# Patient Record
Sex: FEMALE | Race: WHITE | Employment: FULL TIME | URBAN - METROPOLITAN AREA
[De-identification: names, ages, dates, MRNs, and addresses within clinical notes are randomized per-mention and may not be internally consistent; named-entity substitution may affect disease eponyms.]

---

## 2023-10-11 ENCOUNTER — TELEPHONE (OUTPATIENT)
Dept: OBSTETRICS AND GYNECOLOGY | Facility: CLINIC | Age: 48
End: 2023-10-11

## 2023-10-12 DIAGNOSIS — Z30.41 ENCOUNTER FOR SURVEILLANCE OF CONTRACEPTIVE PILLS: Primary | ICD-10-CM

## 2023-10-12 RX ORDER — NORETHINDRONE ACETATE AND ETHINYL ESTRADIOL AND FERROUS FUMARATE 1MG-20(21)
1 KIT ORAL DAILY
COMMUNITY
End: 2023-10-12 | Stop reason: SDUPTHER

## 2023-10-12 RX ORDER — MELOXICAM 15 MG/1
15 TABLET ORAL DAILY PRN
COMMUNITY
End: 2023-11-13 | Stop reason: ALTCHOICE

## 2023-10-12 RX ORDER — PRAVASTATIN SODIUM 40 MG/1
40 TABLET ORAL DAILY
COMMUNITY
End: 2023-10-18

## 2023-10-12 RX ORDER — CLONAZEPAM 1 MG/1
1 TABLET ORAL DAILY PRN
COMMUNITY
End: 2023-10-30 | Stop reason: SDUPTHER

## 2023-10-12 RX ORDER — PANTOPRAZOLE SODIUM 20 MG/1
20 TABLET, DELAYED RELEASE ORAL DAILY PRN
COMMUNITY
End: 2023-12-14 | Stop reason: SDUPTHER

## 2023-10-12 NOTE — TELEPHONE ENCOUNTER
Patient was just here for annual in September. She needs a 90 day prescription of her birth control sent in to mail order pharmacy

## 2023-10-13 RX ORDER — NORETHINDRONE ACETATE AND ETHINYL ESTRADIOL 1MG-20(21)
1 KIT ORAL DAILY
Qty: 84 TABLET | Refills: 3 | Status: SHIPPED | OUTPATIENT
Start: 2023-10-13 | End: 2023-11-13 | Stop reason: WASHOUT

## 2023-11-01 ENCOUNTER — TELEPHONE (OUTPATIENT)
Dept: PRIMARY CARE | Facility: CLINIC | Age: 48
End: 2023-11-01
Payer: COMMERCIAL

## 2023-11-01 NOTE — TELEPHONE ENCOUNTER
Rx Refill Request Telephone Encounter    Name:  Steffany Kelly  :  [id number]  Medication Name:  clonazepam  1mg  By mouth  Daily prn  15    Specific Pharmacy location:  [facility]  Date of last appointment:    Date of next appointment:  23  Best number to reach patient:  [phone]

## 2023-11-11 PROBLEM — E78.5 HYPERLIPIDEMIA: Status: ACTIVE | Noted: 2023-11-11

## 2023-11-11 PROBLEM — R10.13 DYSPEPSIA: Status: ACTIVE | Noted: 2023-11-11

## 2023-11-11 PROBLEM — R87.619 ATYPICAL GLANDULAR CELLS ON CERVICAL PAP SMEAR: Status: ACTIVE | Noted: 2023-11-11

## 2023-11-11 PROBLEM — N64.89 BREAST ASYMMETRY IN FEMALE: Status: ACTIVE | Noted: 2023-11-11

## 2023-11-11 PROBLEM — M79.651 PAIN OF RIGHT THIGH: Status: ACTIVE | Noted: 2023-11-11

## 2023-11-11 PROBLEM — F41.0 PANIC ATTACKS: Status: ACTIVE | Noted: 2023-11-11

## 2023-11-11 PROBLEM — M54.50 LUMBOSACRAL PAIN: Status: ACTIVE | Noted: 2023-11-11

## 2023-11-11 PROBLEM — F41.9 ANXIETY: Status: ACTIVE | Noted: 2023-11-11

## 2023-11-13 ENCOUNTER — OFFICE VISIT (OUTPATIENT)
Dept: PRIMARY CARE | Facility: CLINIC | Age: 48
End: 2023-11-13
Payer: COMMERCIAL

## 2023-11-13 VITALS
SYSTOLIC BLOOD PRESSURE: 122 MMHG | WEIGHT: 215 LBS | BODY MASS INDEX: 46.38 KG/M2 | TEMPERATURE: 97.5 F | HEART RATE: 84 BPM | DIASTOLIC BLOOD PRESSURE: 84 MMHG | HEIGHT: 57 IN

## 2023-11-13 DIAGNOSIS — M79.644 THUMB PAIN, RIGHT: Primary | ICD-10-CM

## 2023-11-13 DIAGNOSIS — M41.86 LEVOSCOLIOSIS OF LUMBAR SPINE: ICD-10-CM

## 2023-11-13 PROBLEM — E78.00 HYPERCHOLESTEROLEMIA: Status: ACTIVE | Noted: 2019-12-17

## 2023-11-13 PROBLEM — M54.50 CHRONIC MIDLINE LOW BACK PAIN WITHOUT SCIATICA: Status: ACTIVE | Noted: 2017-07-31

## 2023-11-13 PROBLEM — G89.29 CHRONIC MIDLINE LOW BACK PAIN WITHOUT SCIATICA: Status: ACTIVE | Noted: 2017-07-31

## 2023-11-13 PROBLEM — F41.9 ANXIETY: Status: ACTIVE | Noted: 2018-11-07

## 2023-11-13 PROBLEM — M51.36 DEGENERATION OF LUMBAR INTERVERTEBRAL DISC: Status: ACTIVE | Noted: 2023-11-13

## 2023-11-13 PROBLEM — M51.369 DEGENERATION OF LUMBAR INTERVERTEBRAL DISC: Status: ACTIVE | Noted: 2023-11-13

## 2023-11-13 PROCEDURE — 1036F TOBACCO NON-USER: CPT | Performed by: FAMILY MEDICINE

## 2023-11-13 PROCEDURE — 99213 OFFICE O/P EST LOW 20 MIN: CPT | Performed by: FAMILY MEDICINE

## 2023-11-13 RX ORDER — CELECOXIB 200 MG/1
200 CAPSULE ORAL DAILY
Qty: 10 CAPSULE | Refills: 0 | Status: SHIPPED | OUTPATIENT
Start: 2023-11-13 | End: 2023-12-14 | Stop reason: WASHOUT

## 2023-11-13 RX ORDER — NORETHINDRONE ACETATE AND ETHINYL ESTRADIOL 1MG-20(21)
1 KIT ORAL DAILY
COMMUNITY
End: 2023-12-20

## 2023-11-13 NOTE — PROGRESS NOTES
"Subjective   Patient ID: Steffany Kelly is a 48 y.o. female who presents for Results (Follow up on xray).    HPI   Here today for follow-up and review of x-ray results.  1.  Low back pain-frequent ache in the left lower back, worse with prolonged standing.  Radiates to left buttock, but not down leg.  Denies left lower extremity weakness, tingling, numbness.  No bowel or bladder incontinence.  2.  Right thumb pain-no relief from topical Voltaren.  Continues to have aching at base of thumb, affects .  No numbness or tingling.  Review of Systems  Per HPI  Objective   /84 (BP Location: Left arm, Patient Position: Sitting, BP Cuff Size: Large adult)   Pulse 84   Temp 36.4 °C (97.5 °F)   Ht 1.448 m (4' 9\")   Wt 97.5 kg (215 lb)   BMI 46.53 kg/m²     Physical Exam  Vitals reviewed  General: Obese female, no acute distress  Musculoskeletal: Right hand examination reveals tenderness today most significant at the right radiocarpal joint, no overlying swelling, no significant pain today with palpation of her right thumb MCP joint.  Negative Finkelstein test.  No pain with palpation over the right wrist.  Examination of back reveals no pain with palpation over the lumbar spine.  Mild tenderness palpation over the left lumbar paraspinal musculature with no discrete trigger point.    Reviewed x-ray reports from network radiology.  Right thumb imaging reveals no evidence of degenerative changes.  Does not comment specifically on the radiocarpal joint.  Unable to view images that patient brought today on CD.  Lumbar spine films  Assessment/Plan          "

## 2023-11-14 NOTE — PROGRESS NOTES
"Subjective   Patient ID: Steffany Kelly is a 48 y.o. female who presents for Results (Follow up on xray).    HPI   Here today for follow-up and review of x-ray results.  1.  Low back pain-frequent ache in the left lower back, worse with prolonged standing.  Radiates to left buttock, but not down leg.  Denies left lower extremity weakness, tingling, numbness.  No bowel or bladder incontinence.  2.  Right thumb pain-no relief from topical Voltaren.  Continues to have aching at base of thumb, affects .  No numbness or tingling.  Review of Systems  Per HPI  Objective   /84 (BP Location: Left arm, Patient Position: Sitting, BP Cuff Size: Large adult)   Pulse 84   Temp 36.4 °C (97.5 °F)   Ht 1.448 m (4' 9\")   Wt 97.5 kg (215 lb)   BMI 46.53 kg/m²     Physical Exam  Vitals reviewed  General: Obese female, no acute distress  Musculoskeletal: Right hand examination reveals tenderness today most significant at the right radiocarpal joint, no overlying swelling, no significant pain today with palpation of her right thumb MCP joint.  Negative Finkelstein test.  No pain with palpation over the right wrist.  Examination of back reveals no pain with palpation over the lumbar spine.  Mild tenderness palpation over the left lumbar paraspinal musculature with no discrete trigger point.    Reviewed x-ray reports from network radiology.  Right thumb imaging reveals no evidence of degenerative changes.  Does not comment specifically on the radiocarpal joint.  Unable to view images that patient brought today on CD.  Lumbar spine report describes 21 degree levoscoliosis, multilevel disc disease with facet arthrosis.  Assessment/Plan   Diagnoses and all orders for this visit:  Thumb pain, right  Previous avoidance of NSAIDs due to concern for GI side effects.  We will try Celebrex as alternate.  Make sure to take with food to minimize GI impact.  -     celecoxib (CeleBREX) 200 mg capsule; Take 1 capsule (200 mg) by mouth once " daily.  -     Referral to Orthopaedic Surgery; Future  Levoscoliosis of lumbar spine  Discussed referral to physical therapy, spine orthopedics.  Declined both at present.

## 2023-12-14 ENCOUNTER — OFFICE VISIT (OUTPATIENT)
Dept: PRIMARY CARE | Facility: CLINIC | Age: 48
End: 2023-12-14
Payer: COMMERCIAL

## 2023-12-14 VITALS
DIASTOLIC BLOOD PRESSURE: 84 MMHG | HEIGHT: 57 IN | SYSTOLIC BLOOD PRESSURE: 130 MMHG | WEIGHT: 216.8 LBS | TEMPERATURE: 97.5 F | HEART RATE: 67 BPM | BODY MASS INDEX: 46.77 KG/M2

## 2023-12-14 DIAGNOSIS — F41.9 ANXIETY: Primary | ICD-10-CM

## 2023-12-14 DIAGNOSIS — E78.5 HYPERLIPIDEMIA, UNSPECIFIED HYPERLIPIDEMIA TYPE: ICD-10-CM

## 2023-12-14 DIAGNOSIS — E78.00 HYPERCHOLESTEROLEMIA: ICD-10-CM

## 2023-12-14 DIAGNOSIS — F41.0 PANIC ATTACKS: ICD-10-CM

## 2023-12-14 DIAGNOSIS — G47.00 INSOMNIA, UNSPECIFIED TYPE: ICD-10-CM

## 2023-12-14 DIAGNOSIS — R10.13 DYSPEPSIA: ICD-10-CM

## 2023-12-14 PROCEDURE — 80368 SEDATIVE HYPNOTICS: CPT

## 2023-12-14 PROCEDURE — 80361 OPIATES 1 OR MORE: CPT

## 2023-12-14 PROCEDURE — 80346 BENZODIAZEPINES1-12: CPT

## 2023-12-14 PROCEDURE — 80373 DRUG SCREENING TRAMADOL: CPT

## 2023-12-14 PROCEDURE — 80365 DRUG SCREENING OXYCODONE: CPT

## 2023-12-14 PROCEDURE — 80354 DRUG SCREENING FENTANYL: CPT

## 2023-12-14 PROCEDURE — 99214 OFFICE O/P EST MOD 30 MIN: CPT | Performed by: FAMILY MEDICINE

## 2023-12-14 PROCEDURE — 1036F TOBACCO NON-USER: CPT | Performed by: FAMILY MEDICINE

## 2023-12-14 PROCEDURE — 80358 DRUG SCREENING METHADONE: CPT

## 2023-12-14 PROCEDURE — 82570 ASSAY OF URINE CREATININE: CPT

## 2023-12-14 PROCEDURE — 80307 DRUG TEST PRSMV CHEM ANLYZR: CPT

## 2023-12-14 RX ORDER — DESVENLAFAXINE 50 MG/1
50 TABLET, EXTENDED RELEASE ORAL DAILY
COMMUNITY
End: 2023-12-14 | Stop reason: SDUPTHER

## 2023-12-14 RX ORDER — CLONAZEPAM 1 MG/1
1 TABLET ORAL DAILY PRN
Qty: 30 TABLET | Refills: 0 | Status: SHIPPED | OUTPATIENT
Start: 2023-12-14 | End: 2024-03-20 | Stop reason: SDUPTHER

## 2023-12-14 RX ORDER — PRAVASTATIN SODIUM 40 MG/1
40 TABLET ORAL NIGHTLY
Qty: 90 TABLET | Refills: 1 | Status: SHIPPED | OUTPATIENT
Start: 2023-12-14 | End: 2024-05-22 | Stop reason: SDUPTHER

## 2023-12-14 RX ORDER — PANTOPRAZOLE SODIUM 20 MG/1
20 TABLET, DELAYED RELEASE ORAL DAILY PRN
Qty: 30 TABLET | Refills: 1 | Status: SHIPPED | OUTPATIENT
Start: 2023-12-14 | End: 2024-01-10

## 2023-12-14 RX ORDER — HYDROXYZINE HYDROCHLORIDE 25 MG/1
12.5-25 TABLET, FILM COATED ORAL NIGHTLY PRN
Qty: 60 TABLET | Refills: 1 | Status: SHIPPED | OUTPATIENT
Start: 2023-12-14 | End: 2024-03-20 | Stop reason: ALTCHOICE

## 2023-12-14 RX ORDER — DESVENLAFAXINE 50 MG/1
50 TABLET, EXTENDED RELEASE ORAL DAILY
Qty: 90 TABLET | Refills: 1 | Status: SHIPPED | OUTPATIENT
Start: 2023-12-14 | End: 2024-05-22 | Stop reason: SDUPTHER

## 2023-12-14 RX ORDER — HYDROXYZINE HYDROCHLORIDE 25 MG/1
12.5-25 TABLET, FILM COATED ORAL NIGHTLY PRN
Qty: 60 TABLET | Refills: 1 | Status: SHIPPED | OUTPATIENT
Start: 2023-12-14 | End: 2023-12-14 | Stop reason: SDUPTHER

## 2023-12-14 RX ORDER — MELOXICAM 15 MG/1
15 TABLET ORAL DAILY
COMMUNITY
End: 2024-03-20 | Stop reason: WASHOUT

## 2023-12-14 ASSESSMENT — PATIENT HEALTH QUESTIONNAIRE - PHQ9
2. FEELING DOWN, DEPRESSED OR HOPELESS: NOT AT ALL
1. LITTLE INTEREST OR PLEASURE IN DOING THINGS: NOT AT ALL
SUM OF ALL RESPONSES TO PHQ9 QUESTIONS 1 AND 2: 0

## 2023-12-14 ASSESSMENT — ENCOUNTER SYMPTOMS
ARTHRALGIAS: 1
ABDOMINAL PAIN: 0
SHORTNESS OF BREATH: 0
HEADACHES: 0
PALPITATIONS: 0
COUGH: 0
DIZZINESS: 0
FEVER: 0
BACK PAIN: 1
APNEA: 0
FATIGUE: 1
APPETITE CHANGE: 0

## 2023-12-14 NOTE — PROGRESS NOTES
Subjective   Patient ID: Steffany Kelly is a 48 y.o. female who presents for Follow-up (3 month follow up).    HPI   Anxiety-stable on pristiq and prn clonazepam.  Typically taking clonazepam 2-3 days per week.  Struggling with sleep onset and sleep maintenance at least several times per week.  Trying to avoid use of clonazepam to alleviate this symptom.  Often cannot fall asleep because mind is racing, will calm down.  Sometimes cannot return to sleep because of 's snoring or acute waking early in the morning for work.  Dyspepsia-symptoms stable on pantoprazole.  Hypercholesterolemia-stable on statin.  Chronic low back pain-using meloxicam and Tylenol as needed for pain relief.  I was unable to get Celebrex at pharmacy due to insurance coverage issues.  Seeing back doctor in several months.  OARRS:  Fiorella Whitt MD on 12/14/2023  3:14 PM  I have personally reviewed the OARRS report for Steffany Kelly. I have considered the risks of abuse, dependence, addiction and diversion    Is the patient prescribed a combination of a benzodiazepine and opioid?  No    Last Urine Drug Screen / ordered today: Yes  No results found for this or any previous visit (from the past 8760 hour(s)).  N/A    Clinical rationale for not completing a Urine Drug Screen: specimen to be obtained      Controlled Substance Agreement:  Date of the Last Agreement: 12/14/2023   Reviewed Controlled Substance Agreement including but not limited to the benefits, risks, and alternatives to treatment with a Controlled Substance medication(s).    Benzodiazepines:  What is the patient's goal of therapy? Alleviate  acute anxiety, panic  Is this being achieved with current treatment? Yes     ALEJANDRA-7:  No data recorded    Activities of Daily Living:   Is your overall impression that this patient is benefiting (symptom reduction outweighs side effects) from benzodiazepine therapy? Yes     1. Physical Functioning: Better  2. Family Relationship: Better  3.  "Social Relationship: Better  4. Mood: Better  5. Sleep Patterns: Better  6. Overall Function: Better    Review of Systems   Constitutional:  Positive for fatigue. Negative for appetite change and fever.   Respiratory:  Negative for apnea, cough and shortness of breath.    Cardiovascular:  Negative for chest pain, palpitations and leg swelling.   Gastrointestinal:  Negative for abdominal pain.   Musculoskeletal:  Positive for arthralgias (Thumb pain) and back pain.   Neurological:  Negative for dizziness and headaches.       Objective   /84 (BP Location: Left arm)   Pulse 67   Temp 36.4 °C (97.5 °F)   Ht 1.448 m (4' 9\")   Wt 98.3 kg (216 lb 12.8 oz)   BMI 46.92 kg/m²     Physical Exam  Constitutional:       Appearance: Normal appearance. She is obese.   HENT:      Head: Normocephalic.      Mouth/Throat:      Mouth: Mucous membranes are moist.      Pharynx: Oropharynx is clear.   Eyes:      Extraocular Movements: Extraocular movements intact.      Conjunctiva/sclera: Conjunctivae normal.   Cardiovascular:      Rate and Rhythm: Normal rate and regular rhythm.      Pulses: Normal pulses.      Heart sounds: Normal heart sounds.   Pulmonary:      Effort: Pulmonary effort is normal.      Breath sounds: Normal breath sounds.   Abdominal:      General: Abdomen is flat.      Palpations: Abdomen is soft.      Tenderness: There is no abdominal tenderness.   Musculoskeletal:      Cervical back: Neck supple.   Lymphadenopathy:      Cervical: No cervical adenopathy.   Skin:     General: Skin is warm and dry.   Neurological:      General: No focal deficit present.      Mental Status: Mental status is at baseline.   Psychiatric:         Mood and Affect: Mood normal.         Thought Content: Thought content normal.         Assessment/Plan   Diagnoses and all orders for this visit:  Anxiety  -     desvenlafaxine 50 mg 24 hr tablet; Take 1 tablet (50 mg) by mouth once daily. Do not crush, chew, or split.  -     clonazePAM " (KlonoPIN) 1 mg tablet; Take 1 tablet (1 mg) by mouth once daily as needed for anxiety.  -     hydrOXYzine HCL (Atarax) 25 mg tablet; Take 0.5-1 tablets (12.5-25 mg) by mouth as needed at bedtime for itching.  Trial of hydroxyzine at bedtime to help with anxiety and sleep onset.  Do not take with clonazepam-     Opiate/Opioid/Benzo Prescription Compliance  Panic attacks  -     desvenlafaxine 50 mg 24 hr tablet; Take 1 tablet (50 mg) by mouth once daily. Do not crush, chew, or split.  Dyspepsia  -     pantoprazole (ProtoNix) 20 mg EC tablet; Take 1 tablet (20 mg) by mouth once daily as needed for heartburn.  Hyperlipidemia, unspecified hyperlipidemia type  -     pravastatin (Pravachol) 40 mg tablet; Take 1 tablet (40 mg) by mouth once daily at bedtime.  Hypercholesterolemia  Insomnia, unspecified type  -     hydrOXYzine HCL (Atarax) 25 mg tablet; Take 0.5-1 tablets (12.5-25 mg) by mouth as needed at bedtime for itching.  Other orders  -     Follow Up In Primary Care - Established; Future

## 2023-12-15 LAB
AMPHETAMINES UR QL SCN: NORMAL
BARBITURATES UR QL SCN: NORMAL
BZE UR QL SCN: NORMAL
CANNABINOIDS UR QL SCN: NORMAL
CREAT UR-MCNC: 117.4 MG/DL (ref 20–320)
PCP UR QL SCN: NORMAL

## 2023-12-18 DIAGNOSIS — Z30.011 ORAL CONTRACEPTIVE PRESCRIBED: Primary | ICD-10-CM

## 2023-12-20 RX ORDER — NORETHINDRONE ACETATE AND ETHINYL ESTRADIOL 1MG-20(21)
1 KIT ORAL DAILY
Qty: 84 TABLET | Refills: 2 | Status: SHIPPED | OUTPATIENT
Start: 2023-12-20

## 2023-12-21 LAB
1OH-MIDAZOLAM UR CFM-MCNC: <25 NG/ML
6MAM UR CFM-MCNC: <25 NG/ML
7AMINOCLONAZEPAM UR CFM-MCNC: 167 NG/ML
A-OH ALPRAZ UR CFM-MCNC: <25 NG/ML
ALPRAZ UR CFM-MCNC: <25 NG/ML
CHLORDIAZEP UR CFM-MCNC: <25 NG/ML
CLONAZEPAM UR CFM-MCNC: <25 NG/ML
CODEINE UR CFM-MCNC: <50 NG/ML
DIAZEPAM UR CFM-MCNC: <25 NG/ML
EDDP UR CFM-MCNC: <25 NG/ML
FENTANYL UR CFM-MCNC: <2.5 NG/ML
HYDROCODONE CTO UR CFM-MCNC: <25 NG/ML
HYDROMORPHONE UR CFM-MCNC: <25 NG/ML
LORAZEPAM UR CFM-MCNC: <25 NG/ML
METHADONE UR CFM-MCNC: <25 NG/ML
MIDAZOLAM UR CFM-MCNC: <25 NG/ML
MORPHINE UR CFM-MCNC: <50 NG/ML
NORDIAZEPAM UR CFM-MCNC: <25 NG/ML
NORFENTANYL UR CFM-MCNC: <2.5 NG/ML
NORHYDROCODONE UR CFM-MCNC: <25 NG/ML
NOROXYCODONE UR CFM-MCNC: <25 NG/ML
NORTRAMADOL UR-MCNC: <50 NG/ML
OXAZEPAM UR CFM-MCNC: <25 NG/ML
OXYCODONE UR CFM-MCNC: <25 NG/ML
OXYMORPHONE UR CFM-MCNC: <25 NG/ML
TEMAZEPAM UR CFM-MCNC: <25 NG/ML
TRAMADOL UR CFM-MCNC: <50 NG/ML
ZOLPIDEM UR CFM-MCNC: <25 NG/ML
ZOLPIDEM UR-MCNC: <25 NG/ML

## 2023-12-27 ENCOUNTER — ANCILLARY PROCEDURE (OUTPATIENT)
Dept: RADIOLOGY | Facility: CLINIC | Age: 48
End: 2023-12-27
Payer: COMMERCIAL

## 2023-12-27 ENCOUNTER — OFFICE VISIT (OUTPATIENT)
Dept: ORTHOPEDIC SURGERY | Facility: CLINIC | Age: 48
End: 2023-12-27
Payer: COMMERCIAL

## 2023-12-27 DIAGNOSIS — M79.644 THUMB PAIN, RIGHT: ICD-10-CM

## 2023-12-27 DIAGNOSIS — M18.11 LOCALIZED PRIMARY OSTEOARTHRITIS OF CARPOMETACARPAL JOINT OF RIGHT THUMB: Primary | ICD-10-CM

## 2023-12-27 PROCEDURE — 99214 OFFICE O/P EST MOD 30 MIN: CPT | Performed by: ORTHOPAEDIC SURGERY

## 2023-12-27 PROCEDURE — 73140 X-RAY EXAM OF FINGER(S): CPT | Mod: RIGHT SIDE | Performed by: RADIOLOGY

## 2023-12-27 PROCEDURE — 99204 OFFICE O/P NEW MOD 45 MIN: CPT | Performed by: ORTHOPAEDIC SURGERY

## 2023-12-27 PROCEDURE — 73140 X-RAY EXAM OF FINGER(S): CPT | Mod: RT

## 2023-12-27 PROCEDURE — 1036F TOBACCO NON-USER: CPT | Performed by: ORTHOPAEDIC SURGERY

## 2023-12-27 NOTE — LETTER
January 6, 2024     Fiorella Whitt MD  [id number]  [facility] [address]    Patient: Steffany Kelly   YOB: 1975   Date of Visit: 12/27/2023       Dear Dr. Fiorella Whitt MD:    Thank you for referring Steffany Kelly to me for evaluation. Below are my notes for this consultation.  If you have questions, please do not hesitate to call me. I look forward to following your patient along with you.       Sincerely,     Ismael Esqueda MD      CC: No Recipients  ______________________________________________________________________________________    CHIEF COMPLAINT         Right thumb pain    ASSESSMENT + PLAN    Right CMC osteoarthritis, Eaton stage II    The nature of basal joint arthritis was reviewed, along with the natural history and expected waxing and waning course.  I reviewed the options for management, including observation, nonsteroidals, activity modification, splinting, oral steroids, cortisone injection, or surgical joint reconstruction, along with the major risks and benefits, and likely success rates of each.     The patient did not want anything invasive at this time, and will continue with activity modification, splints, and nonsteroidals as needed, and followup with any concerns.        HISTORY OF PRESENT ILLNESS       Patient is a 48 y.o. right-hand dominant female , who presents today in consultation from Dr. Whitt for evaluation of right thumb pain.  This has been going on for about 2 months.  No single specific recalled trauma around time of onset.  It waxes and wanes in intensity.  It is generally aching in character but becomes sharper and more focal with pinching or pulling on bad days.  No popping, clicking, or subjective instability.  No numbness or tingling.  No night pain or rest pain.  No significant similar problem on the left.  She has used ibuprofen 800 with some improvement.  No other intervention.    She is not diabetic or hypothyroid.  She  does not smoke.      REVIEW OF SYSTEMS       A 30-item multi-system Review Of Systems was obtained on today's intake form.  This was reviewed with the patient and is correct.  The pertinent positives and negatives are listed above.  The form has been scanned separately into the medical record.      PHYSICAL EXAM    Constitutional:    Appears stated age. Well-developed and well-nourished morbidly obese female in no acute distress.  Psychiatric:         Pleasant normal mood and affect. Behavior is appropriate for the situation.   Head:                   Normocephalic and atraumatic.  Eyes:                    Pupils are equal and round.  Cardiovascular:  2+ radial and ulnar pulses. Fingers well-perfused.  Respiratory:        Effort normal. No respiratory distress. Speaking in complete sentences.  Neurologic:       Alert and oriented to person, place, and time.  Skin:                Skin is intact, warm and dry.  Hematologic / Lymphatic:    No lymphedema or lymphangitis.    Extremities / Musculoskeletal:                      Skin of the right thumb and hand is intact with no erythema, ecchymosis, or diffuse swelling.  Normal skin drag and coloration.  5/5 APB and hand intrinsics with no wasting.  Mild CMC shoulder sign.  Tender at CMC, but not STT.  No MP hyperextension collapse.  Good IP flexion extension pull-through with no triggering.  Positive CMC grind, reproducing chief complaint.  Negative ponce totter and distraction test.  Negative Argueta.  Negative midcarpal shift.  Symmetric wrist and forearm motion.  Sensation intact to light touch in all distributions.  Capillary refill less than 2 seconds.      IMAGING / LABS / EMGs           X-rays right thumb ordered and independently interpreted by me today show mild osteoarthritic narrowing, sclerosis, and early spur formation at the CMC, Eaton stage II.  No acute fractures or foreign bodies.      History reviewed. No pertinent past medical history.    Medication  Documentation Review Audit       Reviewed by Ismael Esqueda MD (Physician) on 01/06/24 at 1515      Medication Order Taking? Sig Documenting Provider Last Dose Status   clonazePAM (KlonoPIN) 1 mg tablet [id number]  Take 1 tablet (1 mg) by mouth once daily as needed for anxiety. Fiorella Whitt MD  Active   desvenlafaxine 50 mg 24 hr tablet [id number]  Take 1 tablet (50 mg) by mouth once daily. Do not crush, chew, or split. Fiorella Whitt MD  Active   hydrOXYzine HCL (Atarax) 25 mg tablet [id number]  Take 0.5-1 tablets (12.5-25 mg) by mouth as needed at bedtime for anxiety. Fiorella Whitt MD  Active   meloxicam (Mobic) 15 mg tablet [id number]  Take 1 tablet (15 mg) by mouth once daily. Historical Provider, MD  Active   norethindrone-e.estradioL-iron (Junel FE 1/20, 28,) 1 mg-20 mcg (21)/75 mg (7) tablet [id number]  Take 1 tablet by mouth once daily. Ashanti Emerson MD  Active   pantoprazole (ProtoNix) 20 mg EC tablet [id number]  Take 1 tablet (20 mg) by mouth once daily as needed for heartburn. Fiorella Whitt MD  Active   pravastatin (Pravachol) 40 mg tablet [id number]  Take 1 tablet (40 mg) by mouth once daily at bedtime. Fiorella Whitt MD  Active                    No Known Allergies    Social History     Socioeconomic History   • Marital status:      Spouse name: Not on file   • Number of children: Not on file   • Years of education: Not on file   • Highest education level: Not on file   Occupational History   • Not on file   Tobacco Use   • Smoking status: Never   • Smokeless tobacco: Never   Vaping Use   • Vaping Use: Never used   Substance and Sexual Activity   • Alcohol use: Yes     Alcohol/week: 2.0 standard drinks of alcohol     Types: 2 Standard drinks or equivalent per week   • Drug use: Not Currently   • Sexual activity: Not on file   Other Topics Concern   • Not on file   Social History Narrative   • Not on file     Social Determinants of Health     Financial Resource Strain:  Not on file   Food Insecurity: Not on file   Transportation Needs: Not on file   Physical Activity: Not on file   Stress: Not on file   Social Connections: Not on file   Intimate Partner Violence: Not on file   Housing Stability: Not on file       Past Surgical History:   Procedure Laterality Date   • OTHER SURGICAL HISTORY  11/18/2021    Jaw surgery   • OTHER SURGICAL HISTORY  11/18/2021    [place] tooth extraction         Electronically Signed      TYRONE Esqueda MD      Orthopaedic Hand Surgery      [phone]

## 2024-01-05 DIAGNOSIS — R10.13 DYSPEPSIA: ICD-10-CM

## 2024-01-06 PROBLEM — M18.11 LOCALIZED PRIMARY OSTEOARTHRITIS OF CARPOMETACARPAL JOINT OF RIGHT THUMB: Status: ACTIVE | Noted: 2024-01-06

## 2024-01-06 NOTE — PROGRESS NOTES
CHIEF COMPLAINT         Right thumb pain    ASSESSMENT + PLAN    Right CMC osteoarthritis, Eaton stage II    The nature of basal joint arthritis was reviewed, along with the natural history and expected waxing and waning course.  I reviewed the options for management, including observation, nonsteroidals, activity modification, splinting, oral steroids, cortisone injection, or surgical joint reconstruction, along with the major risks and benefits, and likely success rates of each.     The patient did not want anything invasive at this time, and will continue with activity modification, splints, and nonsteroidals as needed, and followup with any concerns.        HISTORY OF PRESENT ILLNESS       Patient is a 48 y.o. right-hand dominant female , who presents today in consultation from Dr. Whitt for evaluation of right thumb pain.  This has been going on for about 2 months.  No single specific recalled trauma around time of onset.  It waxes and wanes in intensity.  It is generally aching in character but becomes sharper and more focal with pinching or pulling on bad days.  No popping, clicking, or subjective instability.  No numbness or tingling.  No night pain or rest pain.  No significant similar problem on the left.  She has used ibuprofen 800 with some improvement.  No other intervention.    She is not diabetic or hypothyroid.  She does not smoke.      REVIEW OF SYSTEMS       A 30-item multi-system Review Of Systems was obtained on today's intake form.  This was reviewed with the patient and is correct.  The pertinent positives and negatives are listed above.  The form has been scanned separately into the medical record.      PHYSICAL EXAM    Constitutional:    Appears stated age. Well-developed and well-nourished morbidly obese female in no acute distress.  Psychiatric:         Pleasant normal mood and affect. Behavior is appropriate for the situation.   Head:                   Normocephalic and  atraumatic.  Eyes:                    Pupils are equal and round.  Cardiovascular:  2+ radial and ulnar pulses. Fingers well-perfused.  Respiratory:        Effort normal. No respiratory distress. Speaking in complete sentences.  Neurologic:       Alert and oriented to person, place, and time.  Skin:                Skin is intact, warm and dry.  Hematologic / Lymphatic:    No lymphedema or lymphangitis.    Extremities / Musculoskeletal:                      Skin of the right thumb and hand is intact with no erythema, ecchymosis, or diffuse swelling.  Normal skin drag and coloration.  5/5 APB and hand intrinsics with no wasting.  Mild CMC shoulder sign.  Tender at CMC, but not STT.  No MP hyperextension collapse.  Good IP flexion extension pull-through with no triggering.  Positive CMC grind, reproducing chief complaint.  Negative ponce totter and distraction test.  Negative Argueta.  Negative midcarpal shift.  Symmetric wrist and forearm motion.  Sensation intact to light touch in all distributions.  Capillary refill less than 2 seconds.      IMAGING / LABS / EMGs           X-rays right thumb ordered and independently interpreted by me today show mild osteoarthritic narrowing, sclerosis, and early spur formation at the CMC, Eaton stage II.  No acute fractures or foreign bodies.      History reviewed. No pertinent past medical history.    Medication Documentation Review Audit       Reviewed by Ismael Esqueda MD (Physician) on 01/06/24 at 1515      Medication Order Taking? Sig Documenting Provider Last Dose Status   clonazePAM (KlonoPIN) 1 mg tablet [id number]  Take 1 tablet (1 mg) by mouth once daily as needed for anxiety. Fiorella Whitt MD  Active   desvenlafaxine 50 mg 24 hr tablet [id number]  Take 1 tablet (50 mg) by mouth once daily. Do not crush, chew, or split. Fiorella Whitt MD  Active   hydrOXYzine HCL (Atarax) 25 mg tablet [id number]  Take 0.5-1 tablets (12.5-25 mg) by mouth as needed at bedtime for  anxiety. Fiorella Whitt MD  Active   meloxicam (Mobic) 15 mg tablet [id number]  Take 1 tablet (15 mg) by mouth once daily. Tad Segura MD  Active   norethindrone-e.estradioL-iron (Junel FE 1/20, 28,) 1 mg-20 mcg (21)/75 mg (7) tablet [id number]  Take 1 tablet by mouth once daily. Ashanti Emerson MD  Active   pantoprazole (ProtoNix) 20 mg EC tablet [id number]  Take 1 tablet (20 mg) by mouth once daily as needed for heartburn. Fiorella Whitt MD  Active   pravastatin (Pravachol) 40 mg tablet [id number]  Take 1 tablet (40 mg) by mouth once daily at bedtime. Fiorella Whitt MD  Active                    No Known Allergies    Social History     Socioeconomic History    Marital status:      Spouse name: Not on file    Number of children: Not on file    Years of education: Not on file    Highest education level: Not on file   Occupational History    Not on file   Tobacco Use    Smoking status: Never    Smokeless tobacco: Never   Vaping Use    Vaping Use: Never used   Substance and Sexual Activity    Alcohol use: Yes     Alcohol/week: 2.0 standard drinks of alcohol     Types: 2 Standard drinks or equivalent per week    Drug use: Not Currently    Sexual activity: Not on file   Other Topics Concern    Not on file   Social History Narrative    Not on file     Social Determinants of Health     Financial Resource Strain: Not on file   Food Insecurity: Not on file   Transportation Needs: Not on file   Physical Activity: Not on file   Stress: Not on file   Social Connections: Not on file   Intimate Partner Violence: Not on file   Housing Stability: Not on file       Past Surgical History:   Procedure Laterality Date    OTHER SURGICAL HISTORY  11/18/2021    Jaw surgery    OTHER SURGICAL HISTORY  11/18/2021    [place] tooth extraction         Electronically Signed      TYRONE Esqueda MD      Orthopaedic Hand Surgery      [phone]

## 2024-01-10 RX ORDER — PANTOPRAZOLE SODIUM 20 MG/1
20 TABLET, DELAYED RELEASE ORAL DAILY PRN
Qty: 90 TABLET | Refills: 0 | Status: SHIPPED | OUTPATIENT
Start: 2024-01-10

## 2024-03-20 ENCOUNTER — OFFICE VISIT (OUTPATIENT)
Dept: PRIMARY CARE | Facility: CLINIC | Age: 49
End: 2024-03-20
Payer: COMMERCIAL

## 2024-03-20 ENCOUNTER — TELEPHONE (OUTPATIENT)
Dept: PRIMARY CARE | Facility: CLINIC | Age: 49
End: 2024-03-20

## 2024-03-20 VITALS
WEIGHT: 219.8 LBS | HEART RATE: 91 BPM | TEMPERATURE: 98.1 F | DIASTOLIC BLOOD PRESSURE: 85 MMHG | SYSTOLIC BLOOD PRESSURE: 117 MMHG | HEIGHT: 57 IN | BODY MASS INDEX: 47.42 KG/M2

## 2024-03-20 DIAGNOSIS — R10.13 DYSPEPSIA: ICD-10-CM

## 2024-03-20 DIAGNOSIS — F41.9 ANXIETY: Primary | ICD-10-CM

## 2024-03-20 DIAGNOSIS — E66.01 CLASS 3 SEVERE OBESITY DUE TO EXCESS CALORIES WITH SERIOUS COMORBIDITY AND BODY MASS INDEX (BMI) OF 40.0 TO 44.9 IN ADULT (MULTI): ICD-10-CM

## 2024-03-20 DIAGNOSIS — E78.00 HYPERCHOLESTEROLEMIA: ICD-10-CM

## 2024-03-20 PROCEDURE — 1036F TOBACCO NON-USER: CPT | Performed by: FAMILY MEDICINE

## 2024-03-20 PROCEDURE — 99214 OFFICE O/P EST MOD 30 MIN: CPT | Performed by: FAMILY MEDICINE

## 2024-03-20 PROCEDURE — 3008F BODY MASS INDEX DOCD: CPT | Performed by: FAMILY MEDICINE

## 2024-03-20 RX ORDER — CLONAZEPAM 1 MG/1
1 TABLET ORAL DAILY PRN
Qty: 30 TABLET | Refills: 0 | Status: SHIPPED | OUTPATIENT
Start: 2024-03-20

## 2024-03-20 ASSESSMENT — ENCOUNTER SYMPTOMS
SHORTNESS OF BREATH: 0
COUGH: 0
CHILLS: 0
PALPITATIONS: 0
DIZZINESS: 0
HEADACHES: 0
CHEST TIGHTNESS: 0
FATIGUE: 0

## 2024-03-20 ASSESSMENT — PATIENT HEALTH QUESTIONNAIRE - PHQ9
SUM OF ALL RESPONSES TO PHQ9 QUESTIONS 1 AND 2: 0
1. LITTLE INTEREST OR PLEASURE IN DOING THINGS: NOT AT ALL
2. FEELING DOWN, DEPRESSED OR HOPELESS: NOT AT ALL

## 2024-03-20 NOTE — TELEPHONE ENCOUNTER
Pt is wondering if she is going to need a referral for the drug test if she goes outside of . Please advise. Thank you

## 2024-05-16 ENCOUNTER — DOCUMENTATION (OUTPATIENT)
Dept: PRIMARY CARE | Facility: CLINIC | Age: 49
End: 2024-05-16
Payer: COMMERCIAL

## 2024-05-16 LAB — HEMOGLOBIN A1C/HEMOGLOBIN TOTAL IN BLOOD EXTERNAL: 5.5 %

## 2024-05-22 ENCOUNTER — OFFICE VISIT (OUTPATIENT)
Dept: PRIMARY CARE | Facility: CLINIC | Age: 49
End: 2024-05-22
Payer: COMMERCIAL

## 2024-05-22 VITALS
BODY MASS INDEX: 46.9 KG/M2 | TEMPERATURE: 97.2 F | HEIGHT: 57 IN | DIASTOLIC BLOOD PRESSURE: 78 MMHG | WEIGHT: 217.4 LBS | SYSTOLIC BLOOD PRESSURE: 112 MMHG | HEART RATE: 74 BPM

## 2024-05-22 DIAGNOSIS — Z12.11 ENCOUNTER FOR SCREENING FOR MALIGNANT NEOPLASM OF COLON: ICD-10-CM

## 2024-05-22 DIAGNOSIS — E66.01 CLASS 3 SEVERE OBESITY DUE TO EXCESS CALORIES WITH SERIOUS COMORBIDITY AND BODY MASS INDEX (BMI) OF 45.0 TO 49.9 IN ADULT (MULTI): ICD-10-CM

## 2024-05-22 DIAGNOSIS — Z00.00 ADULT GENERAL MEDICAL EXAM: Primary | ICD-10-CM

## 2024-05-22 DIAGNOSIS — E78.5 HYPERLIPIDEMIA, UNSPECIFIED HYPERLIPIDEMIA TYPE: ICD-10-CM

## 2024-05-22 DIAGNOSIS — F41.9 ANXIETY: ICD-10-CM

## 2024-05-22 DIAGNOSIS — F41.0 PANIC ATTACKS: ICD-10-CM

## 2024-05-22 PROCEDURE — 1036F TOBACCO NON-USER: CPT | Performed by: FAMILY MEDICINE

## 2024-05-22 PROCEDURE — 99396 PREV VISIT EST AGE 40-64: CPT | Performed by: FAMILY MEDICINE

## 2024-05-22 PROCEDURE — 3008F BODY MASS INDEX DOCD: CPT | Performed by: FAMILY MEDICINE

## 2024-05-22 RX ORDER — PRAVASTATIN SODIUM 40 MG/1
40 TABLET ORAL NIGHTLY
Qty: 90 TABLET | Refills: 1 | Status: SHIPPED | OUTPATIENT
Start: 2024-05-22

## 2024-05-22 RX ORDER — DESVENLAFAXINE 50 MG/1
50 TABLET, EXTENDED RELEASE ORAL DAILY
Qty: 90 TABLET | Refills: 1 | Status: SHIPPED | OUTPATIENT
Start: 2024-05-22

## 2024-05-22 ASSESSMENT — ENCOUNTER SYMPTOMS
CHEST TIGHTNESS: 0
LIGHT-HEADEDNESS: 0
COUGH: 0
DECREASED CONCENTRATION: 0
HEADACHES: 0
NERVOUS/ANXIOUS: 0
ABDOMINAL PAIN: 0
CHILLS: 0
FEVER: 0
SHORTNESS OF BREATH: 0
FATIGUE: 0
DIFFICULTY URINATING: 0
SLEEP DISTURBANCE: 1
PALPITATIONS: 0
BACK PAIN: 1

## 2024-05-22 ASSESSMENT — PATIENT HEALTH QUESTIONNAIRE - PHQ9
2. FEELING DOWN, DEPRESSED OR HOPELESS: NOT AT ALL
SUM OF ALL RESPONSES TO PHQ9 QUESTIONS 1 AND 2: 0
1. LITTLE INTEREST OR PLEASURE IN DOING THINGS: NOT AT ALL

## 2024-05-22 NOTE — PROGRESS NOTES
"Subjective   Patient ID: Steffany Kelly is a 49 y.o. female who presents for Annual Exam (AWV).    HPI   Here for annual wellness visit.  Nutrition: good variety in diet, but admits \"could eat better\".  Has some veggies,fruits.  Milk. Water.  Some fast foods, snacks, sweets.  Exercise: on feet at work. No additional exercise.  Sleep: delayed sleep onset, but sometimes falls asleep on couch. Light sleeper. 4-5 hours per night  Eye:  >2 yrs ago.  Dental: q6 months    Review of Systems   Constitutional:  Negative for chills, fatigue and fever.   Respiratory:  Negative for cough, chest tightness and shortness of breath.    Cardiovascular:  Negative for chest pain, palpitations and leg swelling.   Gastrointestinal:  Negative for abdominal pain.   Genitourinary:  Negative for difficulty urinating and vaginal bleeding.   Musculoskeletal:  Positive for back pain.   Neurological:  Negative for light-headedness and headaches.   Psychiatric/Behavioral:  Positive for sleep disturbance. Negative for decreased concentration. The patient is not nervous/anxious.        Objective   /86 (BP Location: Left arm, Patient Position: Sitting, BP Cuff Size: Large adult)   Pulse 74   Temp 36.2 °C (97.2 °F)   Ht 1.448 m (4' 9\")   Wt 98.6 kg (217 lb 6.4 oz)   BMI 47.04 kg/m²     Physical Exam  Vitals reviewed.   Constitutional:       General: She is not in acute distress.     Appearance: Normal appearance. She is obese.   HENT:      Head: Normocephalic and atraumatic.      Right Ear: Tympanic membrane and ear canal normal.      Left Ear: Tympanic membrane and ear canal normal.      Nose: Nose normal. No congestion or rhinorrhea.      Mouth/Throat:      Mouth: Mucous membranes are moist.      Pharynx: Oropharynx is clear.   Eyes:      Extraocular Movements: Extraocular movements intact.      Conjunctiva/sclera: Conjunctivae normal.      Pupils: Pupils are equal, round, and reactive to light.   Cardiovascular:      Rate and Rhythm: " Normal rate and regular rhythm.      Pulses: Normal pulses.      Heart sounds: Normal heart sounds. No murmur heard.  Pulmonary:      Effort: Pulmonary effort is normal. No respiratory distress.      Breath sounds: Normal breath sounds.   Abdominal:      General: Abdomen is flat. Bowel sounds are normal.      Palpations: Abdomen is soft.      Tenderness: There is no abdominal tenderness.   Musculoskeletal:      Cervical back: Normal range of motion and neck supple.      Right lower leg: No edema.      Left lower leg: No edema.   Lymphadenopathy:      Cervical: No cervical adenopathy.   Skin:     General: Skin is warm and dry.   Neurological:      General: No focal deficit present.      Mental Status: She is alert and oriented to person, place, and time.   Psychiatric:         Mood and Affect: Mood normal.         Thought Content: Thought content normal.         Assessment/Plan   Diagnoses and all orders for this visit:  Adult general medical exam  Recommend updating tetanus booster which she declined  Continue to follow with GYN for Wellwoman care  Discussed strategies to improve diet choices, increase physical activity  Class 3 severe obesity due to excess calories with serious comorbidity and body mass index (BMI) of 45.0 to 49.9 in adult (Multi)  Encounter for screening for malignant neoplasm of colon  Encouraged her to proceed with colon cancer screening particular given family history and grandparent.  Advise colonoscopy.  She declined order as she wants more time to think about it.  Reviewed delay in screening may result in higher risk forward disease progression.  Anxiety  -     desvenlafaxine 50 mg 24 hr tablet; Take 1 tablet (50 mg) by mouth once daily. Do not crush, chew, or split.  Panic attacks  -     desvenlafaxine 50 mg 24 hr tablet; Take 1 tablet (50 mg) by mouth once daily. Do not crush, chew, or split.  Hyperlipidemia, unspecified hyperlipidemia type  -     pravastatin (Pravachol) 40 mg tablet; Take  1 tablet (40 mg) by mouth once daily at bedtime.

## 2024-07-24 ENCOUNTER — APPOINTMENT (OUTPATIENT)
Dept: OBSTETRICS AND GYNECOLOGY | Facility: CLINIC | Age: 49
End: 2024-07-24
Payer: COMMERCIAL

## 2024-07-24 VITALS
WEIGHT: 213.8 LBS | HEIGHT: 58 IN | SYSTOLIC BLOOD PRESSURE: 112 MMHG | DIASTOLIC BLOOD PRESSURE: 80 MMHG | BODY MASS INDEX: 44.88 KG/M2

## 2024-07-24 DIAGNOSIS — Z30.011 ORAL CONTRACEPTIVE PRESCRIBED: ICD-10-CM

## 2024-07-24 DIAGNOSIS — Z12.31 VISIT FOR SCREENING MAMMOGRAM: ICD-10-CM

## 2024-07-24 DIAGNOSIS — Z01.419 WELL WOMAN EXAM WITH ROUTINE GYNECOLOGICAL EXAM: ICD-10-CM

## 2024-07-24 PROBLEM — R87.619 ATYPICAL GLANDULAR CELLS ON CERVICAL PAP SMEAR: Status: RESOLVED | Noted: 2023-11-11 | Resolved: 2024-07-24

## 2024-07-24 PROCEDURE — 87624 HPV HI-RISK TYP POOLED RSLT: CPT

## 2024-07-24 PROCEDURE — 99396 PREV VISIT EST AGE 40-64: CPT | Performed by: OBSTETRICS & GYNECOLOGY

## 2024-07-24 PROCEDURE — 3008F BODY MASS INDEX DOCD: CPT | Performed by: OBSTETRICS & GYNECOLOGY

## 2024-07-24 PROCEDURE — 1036F TOBACCO NON-USER: CPT | Performed by: OBSTETRICS & GYNECOLOGY

## 2024-07-24 RX ORDER — NORETHINDRONE ACETATE AND ETHINYL ESTRADIOL 1MG-20(21)
1 KIT ORAL DAILY
Qty: 84 TABLET | Refills: 3 | Status: SHIPPED | OUTPATIENT
Start: 2024-07-24

## 2024-07-24 NOTE — PROGRESS NOTES
Subjective   Patient ID: Steffany Kelly is a 49 y.o. female who presents for Annual Exam.    Last pap: 4/1/22, Atypical endocervical cells, [place] 7/26/22, 9/12/23 normal HPV-,   Last mamm: 4/10/24, ordered for next year  LMP: 6/30/24 light spotting regular   Contraception: Pills  Sexually active: yes  Self breast exam: no  Diet: somewhat balanced  Exercise: no    HPI  On OCP doing well. Wants to cont.   Review of Systems  Neg   Objective   Physical Exam  Physical Exam         Appearance: Normal appearance. Affect normal and alert  Pulmonary:      Effort: Pulmonary effort is normal. Breath sounds clear  Skin: no rashes or lesions   Breasts:     Breasts bilaterally are symmetrical. No masses or axillary adenopathy. No skin or nipple changes    Abdominal:     Abdomen is flat, soft, nontender. No distension. No mass palpated.      Genitourinary:     Labia: no skin lesions or rash       Urethra: No lesions.      Bladder with no prolapse     Vagina: No discharge, mucosa is pink with no lesions.     Cervix:    mobile and nontender no discharge     Uterus:   Not enlarged, small, nontender.      Adnexa: Bilaterally with  No mass or tenderness.            Extremities:  Nontender, no edema. Normal range of motion    Assessment/Plan   Diagnoses and all orders for this visit:  Visit for screening mammogram  -     BI mammo bilateral screening tomosynthesis; Future  Well woman exam with routine gynecological exam  -     THINPREP PAP  Oral contraceptive prescribed  -     norethindrone-e.estradioL-iron (Junel FE 1/20, 28,) 1 mg-20 mcg (21)/75 mg (7) tablet; Take 1 tablet by mouth once daily.     MD Sandie Miller, [facility] 07/24/24 10:05 AM

## 2024-07-31 ENCOUNTER — APPOINTMENT (OUTPATIENT)
Dept: PRIMARY CARE | Facility: CLINIC | Age: 49
End: 2024-07-31
Payer: COMMERCIAL

## 2024-07-31 VITALS
WEIGHT: 215.2 LBS | TEMPERATURE: 97.2 F | HEART RATE: 72 BPM | HEIGHT: 58 IN | SYSTOLIC BLOOD PRESSURE: 126 MMHG | BODY MASS INDEX: 45.17 KG/M2 | DIASTOLIC BLOOD PRESSURE: 83 MMHG

## 2024-07-31 DIAGNOSIS — R10.13 DYSPEPSIA: ICD-10-CM

## 2024-07-31 DIAGNOSIS — G89.29 CHRONIC MIDLINE LOW BACK PAIN WITHOUT SCIATICA: ICD-10-CM

## 2024-07-31 DIAGNOSIS — F41.9 ANXIETY: Primary | ICD-10-CM

## 2024-07-31 DIAGNOSIS — M51.36 DEGENERATION OF LUMBAR INTERVERTEBRAL DISC: ICD-10-CM

## 2024-07-31 DIAGNOSIS — M54.50 CHRONIC MIDLINE LOW BACK PAIN WITHOUT SCIATICA: ICD-10-CM

## 2024-07-31 DIAGNOSIS — F41.0 PANIC ATTACKS: ICD-10-CM

## 2024-07-31 PROCEDURE — 3008F BODY MASS INDEX DOCD: CPT | Performed by: FAMILY MEDICINE

## 2024-07-31 PROCEDURE — 99214 OFFICE O/P EST MOD 30 MIN: CPT | Performed by: FAMILY MEDICINE

## 2024-07-31 PROCEDURE — 1036F TOBACCO NON-USER: CPT | Performed by: FAMILY MEDICINE

## 2024-07-31 RX ORDER — PANTOPRAZOLE SODIUM 20 MG/1
20 TABLET, DELAYED RELEASE ORAL DAILY PRN
Qty: 90 TABLET | Refills: 0 | Status: SHIPPED | OUTPATIENT
Start: 2024-07-31

## 2024-07-31 RX ORDER — MELOXICAM 7.5 MG/1
7.5 TABLET ORAL AS NEEDED
COMMUNITY
End: 2024-07-31 | Stop reason: SDUPTHER

## 2024-07-31 RX ORDER — CLONAZEPAM 1 MG/1
1 TABLET ORAL DAILY PRN
Qty: 30 TABLET | Refills: 0 | Status: SHIPPED | OUTPATIENT
Start: 2024-07-31

## 2024-07-31 RX ORDER — MELOXICAM 7.5 MG/1
7.5 TABLET ORAL AS NEEDED
Qty: 30 TABLET | Refills: 0 | Status: SHIPPED | OUTPATIENT
Start: 2024-07-31 | End: 2024-08-30

## 2024-07-31 ASSESSMENT — PATIENT HEALTH QUESTIONNAIRE - PHQ9
SUM OF ALL RESPONSES TO PHQ9 QUESTIONS 1 AND 2: 0
2. FEELING DOWN, DEPRESSED OR HOPELESS: NOT AT ALL
1. LITTLE INTEREST OR PLEASURE IN DOING THINGS: NOT AT ALL

## 2024-07-31 NOTE — PROGRESS NOTES
Subjective   Patient ID: Steffany Kelly is a 49 y.o. female who presents for Follow-up (Fuv - no questions/concerns at this time).    HPI here for follow-up and medication review.  1.  Anxiety-baseline symptoms well-controlled with Pristiq.  Uses alprazolam as needed for acute relief of panic.  Uses sporadically, sometimes does not take it all in a week or on other occasions may need to take 2-3 doses per week.  Reports good relief of acute flares of anxiety with alprazolam with no residual grogginess or sedation.  2.  Chronic low back pain-continues to have chronic low back pain often with radiation of pain into the left buttock and left leg.  Saw spine surgery in February.  Has not proceeded with any additional workup because she was frustrated by since that they wanted to image her cervical spine due to some findings of hyperreflexia.  Concerned that the planned workup does not address her chronic pain which impacts her daily functioning.  Denies any bowel or bladder incontinence.  OARRS:  Fiorella Whitt MD on 7/31/2024 11:26 AM  I have personally reviewed the OARRS report for Steffany Kelly. I have considered the risks of abuse, dependence, addiction and diversion and I believe that it is clinically appropriate for Steffany Kelly to be prescribed this medication    Is the patient prescribed a combination of a benzodiazepine and opioid?  Yes, I feel it is clincially indicated to continue the medication and have discussed with the patient risks/benefits/alternatives.    Last Urine Drug Screen / ordered today: Yes  Recent Results (from the past 8760 hour(s))   Confirmation Opiate/Opioid/Benzo Prescription Compliance    Collection Time: 12/14/23  5:52 PM   Result Value Ref Range    Clonazepam <25 <25 ng/mL    7-Aminoclonazepam 167 (H) <25 ng/mL    Alprazolam <25 <25 ng/mL    Alpha-Hydroxyalprazolam <25 <25 ng/mL    Midazolam <25 <25 ng/mL    Alpha-Hydroxymidazolam <25 <25 ng/mL    Chlordiazepoxide <25 <25 ng/mL     Diazepam <25 <25 ng/mL    Nordiazepam <25 <25 ng/mL    Temazepam <25 <25 ng/mL    Oxazepam <25 <25 ng/mL    Lorazepam <25 <25 ng/mL    Methadone <25 <25 ng/mL    EDDP <25 <25 ng/mL    6-Acetylmorphine <25 <25 ng/mL    Codeine <50 <50 ng/mL    Hydrocodone <25 <25 ng/mL    Hydromorphone <25 <25 ng/mL    Morphine  <50 <50 ng/mL    Norhydrocodone <25 <25 ng/mL    Noroxycodone <25 <25 ng/mL    Oxycodone <25 <25 ng/mL    Oxymorphone <25 <25 ng/mL    Fentanyl <2.5 <2.5 ng/mL    Norfentanyl <2.5 <2.5 ng/mL    Tramadol <50 <50 ng/mL    O-Desmethyltramadol <50 <50 ng/mL    Zolpidem <25 <25 ng/mL    Zolpidem Metabolite (ZCA) <25 <25 ng/mL   Screen Opiate/Opioid/Benzo Prescription Compliance    Collection Time: 12/14/23  5:52 PM   Result Value Ref Range    Creatinine, Urine Random 117.4 20.0 - 320.0 mg/dL    Amphetamine Screen, Urine Presumptive Negative Presumptive Negative    Barbiturate Screen, Urine Presumptive Negative Presumptive Negative    Cannabinoid Screen, Urine Presumptive Negative Presumptive Negative    Cocaine Metabolite Screen, Urine Presumptive Negative Presumptive Negative    PCP Screen, Urine Presumptive Negative Presumptive Negative     Results are as expected.         Controlled Substance Agreement:  Date of the Last Agreement: 12/14/23  Reviewed Controlled Substance Agreement including but not limited to the benefits, risks, and alternatives to treatment with a Controlled Substance medication(s).    Benzodiazepines:  What is the patient's goal of therapy?  Alleviate acute anxiety/panic  Is this being achieved with current treatment?  Yes    ALEJANDRA-7:  No data recorded    Activities of Daily Living:   Is your overall impression that this patient is benefiting (symptom reduction outweighs side effects) from benzodiazepine therapy? Yes     1. Physical Functioning: Better  2. Family Relationship: Better  3. Social Relationship: Better  4. Mood: Better  5. Sleep Patterns: Better  6. Overall Function:  "Better  Review of Systems  Per HPI  Objective   /83 (BP Location: Left arm, Patient Position: Sitting, BP Cuff Size: Adult)   Pulse 72   Temp 36.2 °C (97.2 °F)   Ht 1.473 m (4' 10\")   Wt 97.6 kg (215 lb 3.2 oz)   LMP 06/30/2024 (Approximate)   BMI 44.98 kg/m²     Physical Exam  Vitals reviewed.   Constitutional:       General: She is not in acute distress.     Appearance: Normal appearance. She is obese.   HENT:      Head: Normocephalic and atraumatic.      Mouth/Throat:      Mouth: Mucous membranes are moist.      Pharynx: Oropharynx is clear.   Eyes:      Extraocular Movements: Extraocular movements intact.      Conjunctiva/sclera: Conjunctivae normal.   Cardiovascular:      Rate and Rhythm: Normal rate and regular rhythm.      Pulses: Normal pulses.      Heart sounds: Normal heart sounds. No murmur heard.  Pulmonary:      Effort: Pulmonary effort is normal.      Breath sounds: Normal breath sounds. No wheezing.   Abdominal:      Palpations: Abdomen is soft.      Tenderness: There is no abdominal tenderness. There is no guarding.   Musculoskeletal:      Cervical back: Neck supple. No tenderness.      Lumbar back: Tenderness present.      Right lower leg: No edema.      Left lower leg: No edema.      Comments: Mild tenderness to palpation in the left lumbar paraspinal musculature, no discrete spasm.   Lymphadenopathy:      Cervical: No cervical adenopathy.   Neurological:      Mental Status: She is alert.       Reviewed orthopedic note from Dr. Gerard February 2024 in detail  Assessment/Plan   Diagnoses and all orders for this visit:  Anxiety  Continue Pristiq and as needed clonazepam.  OARRS report reviewed.  : Controlled substance agreement and urine drug screen are up-to-date.  She can follow-up in 6 months, but will need to return sooner if she needs refill on clonazepam after 90 days from today's visit.  -     clonazePAM (KlonoPIN) 1 mg tablet; Take 1 tablet (1 mg) by mouth once daily as needed " for anxiety.  Degeneration of lumbar intervertebral disc  As needed meloxicam for acute back pain relief, avoid chronic daily use to minimize impact on blood pressure, kidneys.  Discussed that based upon my review of spine surgery note, recommendation is to proceed with physical therapy.  If persistence of pain, then proceed with MRI imaging of the lumbar spine with possible epidural injection to follow.  If ongoing symptoms, or concerns for myelopathy, then proceed with imaging of cervical spine.  -     meloxicam (Mobic) 7.5 mg tablet; Take 1 tablet (7.5 mg) by mouth if needed for mild pain (1 - 3).  -     Referral to Physical Therapy; Future  Panic attacks  -     clonazePAM (KlonoPIN) 1 mg tablet; Take 1 tablet (1 mg) by mouth once daily as needed for anxiety.  Dyspepsia  -     pantoprazole (ProtoNix) 20 mg EC tablet; Take 1 tablet (20 mg) by mouth once daily as needed for heartburn.  Chronic midline low back pain without sciatica  -     Referral to Physical Therapy; Future  Other orders  -     Follow Up In Primary Care - Established

## 2024-08-05 LAB
CYTOLOGY CMNT CVX/VAG CYTO-IMP: NORMAL
HPV HR 12 DNA GENITAL QL NAA+PROBE: NEGATIVE
HPV HR GENOTYPES PNL CVX NAA+PROBE: NEGATIVE
HPV16 DNA SPEC QL NAA+PROBE: NEGATIVE
HPV18 DNA SPEC QL NAA+PROBE: NEGATIVE
LAB AP HPV GENOTYPE QUESTION: YES
LAB AP HPV HR: NORMAL
LAB AP PREVIOUS ABNORMAL HISTORY: NORMAL
LABORATORY COMMENT REPORT: NORMAL
LMP START DATE: NORMAL
PATH REPORT.TOTAL CANCER: NORMAL

## 2024-10-07 ENCOUNTER — PATIENT MESSAGE (OUTPATIENT)
Dept: PRIMARY CARE | Facility: CLINIC | Age: 49
End: 2024-10-07
Payer: COMMERCIAL

## 2024-11-21 ENCOUNTER — APPOINTMENT (OUTPATIENT)
Dept: PRIMARY CARE | Facility: CLINIC | Age: 49
End: 2024-11-21
Payer: COMMERCIAL

## 2024-11-21 VITALS
HEART RATE: 89 BPM | TEMPERATURE: 97.6 F | SYSTOLIC BLOOD PRESSURE: 126 MMHG | DIASTOLIC BLOOD PRESSURE: 89 MMHG | BODY MASS INDEX: 46.38 KG/M2 | HEIGHT: 57 IN | WEIGHT: 215 LBS

## 2024-11-21 DIAGNOSIS — M51.362 DEGENERATION OF INTERVERTEBRAL DISC OF LUMBAR REGION WITH DISCOGENIC BACK PAIN AND LOWER EXTREMITY PAIN: ICD-10-CM

## 2024-11-21 DIAGNOSIS — G47.00 INSOMNIA, UNSPECIFIED TYPE: Primary | ICD-10-CM

## 2024-11-21 DIAGNOSIS — M54.50 CHRONIC MIDLINE LOW BACK PAIN WITHOUT SCIATICA: ICD-10-CM

## 2024-11-21 DIAGNOSIS — G89.29 CHRONIC MIDLINE LOW BACK PAIN WITHOUT SCIATICA: ICD-10-CM

## 2024-11-21 PROCEDURE — 3008F BODY MASS INDEX DOCD: CPT | Performed by: FAMILY MEDICINE

## 2024-11-21 PROCEDURE — 99213 OFFICE O/P EST LOW 20 MIN: CPT | Performed by: FAMILY MEDICINE

## 2024-11-21 RX ORDER — TRAZODONE HYDROCHLORIDE 50 MG/1
50 TABLET ORAL NIGHTLY
Qty: 30 TABLET | Refills: 1 | Status: SHIPPED | OUTPATIENT
Start: 2024-11-21 | End: 2025-01-20

## 2024-11-21 RX ORDER — MELOXICAM 15 MG/1
15 TABLET ORAL DAILY
COMMUNITY

## 2024-11-21 RX ORDER — TRAZODONE HYDROCHLORIDE 50 MG/1
50 TABLET ORAL NIGHTLY
Qty: 30 TABLET | Refills: 0 | Status: SHIPPED | OUTPATIENT
Start: 2024-11-21 | End: 2024-11-21

## 2024-11-21 NOTE — PROGRESS NOTES
"Subjective   Patient ID: Steffany Kelly is a 49 y.o. female who presents for Follow-up (Follow up sleep issue. ).    HPI   Here today with concerns regarding poor sleep.  Occasional difficulty with sleep onset, accompanied by frequent overnight wakenings and difficulty returning to sleep.    Poor sleep hygiene-often tired upon getting home from work, falls asleep on couch.    Once up in bed-sleep onset: 30 min-2 hrs  Wakes up overnight at least 1 time, often twice.  Then will take at least 30 + min to return to sleep  Endorses loud snoring, but no apneic episodes.  caffeine: 1-2 cups coffee per day, nothing after midafternoon.  Previous trial of hydroxyzine unsuccessful.  Reports a relative takes Ambien nightly for sleep with good results..   Frequent low back pain, worse if standing or on feet for long periods of time.  Symptoms are stable.  Not currently following up with orthopedics.  Does not wish to pursue physical therapy.  Review of Systems  Per HPI  Objective   /89 (BP Location: Left arm, Patient Position: Sitting)   Pulse 89   Temp 36.4 °C (97.6 °F)   Ht 1.448 m (4' 9\")   Wt 97.5 kg (215 lb)   BMI 46.53 kg/m²     Physical Exam  Constitutional:       General: She is not in acute distress.     Appearance: Normal appearance. She is obese. She is not ill-appearing.   HENT:      Head: Normocephalic and atraumatic.   Neck:      Vascular: No carotid bruit.   Cardiovascular:      Rate and Rhythm: Normal rate and regular rhythm.      Pulses: Normal pulses.      Heart sounds: Normal heart sounds. No murmur heard.  Pulmonary:      Effort: Pulmonary effort is normal.      Breath sounds: Normal breath sounds. No wheezing, rhonchi or rales.   Musculoskeletal:      Cervical back: Neck supple.      Right lower leg: No edema.      Left lower leg: No edema.   Lymphadenopathy:      Cervical: No cervical adenopathy.   Skin:     General: Skin is warm and dry.   Neurological:      General: No focal deficit present.      " Mental Status: She is alert. Mental status is at baseline.   Psychiatric:         Mood and Affect: Mood normal.         Thought Content: Thought content normal.         Judgment: Judgment normal.         Assessment/Plan   Assessment & Plan  Insomnia, unspecified type  Stressed the importance of good sleep hygiene.  Strongly recommend against napping.  If she is tired, recommend she go up to bed to avoid falling asleep on couch.  Encouraged exercise earlier in the day to help improve sleep.  Reduce caffeine.  Try adding trazodone 25 mg at bedtime, increasing to 50 mg if needed/tolerated  Orders:    traZODone (Desyrel) 50 mg tablet; Take 1 tablet (50 mg) by mouth once daily at bedtime.    Chronic midline low back pain without sciatica  Encouraged increased physical activity, call if referral for PT desired.       Degeneration of intervertebral disc of lumbar region with discogenic back pain and lower extremity pain

## 2024-12-13 DIAGNOSIS — G47.00 INSOMNIA, UNSPECIFIED TYPE: ICD-10-CM

## 2024-12-17 RX ORDER — TRAZODONE HYDROCHLORIDE 50 MG/1
50 TABLET ORAL NIGHTLY
Qty: 90 TABLET | Refills: 0 | Status: SHIPPED | OUTPATIENT
Start: 2024-12-17 | End: 2025-03-17

## 2025-01-20 ENCOUNTER — TELEPHONE (OUTPATIENT)
Dept: PRIMARY CARE | Facility: CLINIC | Age: 50
End: 2025-01-20
Payer: COMMERCIAL

## 2025-01-20 DIAGNOSIS — Z30.011 ORAL CONTRACEPTIVE PRESCRIBED: ICD-10-CM

## 2025-01-20 DIAGNOSIS — F41.9 ANXIETY: ICD-10-CM

## 2025-01-20 DIAGNOSIS — E78.5 HYPERLIPIDEMIA, UNSPECIFIED HYPERLIPIDEMIA TYPE: ICD-10-CM

## 2025-01-20 DIAGNOSIS — F41.0 PANIC ATTACKS: ICD-10-CM

## 2025-01-20 NOTE — TELEPHONE ENCOUNTER
Pt requesting rx refill for pravastatin and desvenlafaxine 50 mg 24 hr tablet to [facility] pharmacy.     Pending appt 1/22/25  LOV 11/21/24

## 2025-01-21 RX ORDER — PRAVASTATIN SODIUM 40 MG/1
40 TABLET ORAL NIGHTLY
Qty: 90 TABLET | Refills: 1 | Status: SHIPPED | OUTPATIENT
Start: 2025-01-21

## 2025-01-21 RX ORDER — DESVENLAFAXINE 50 MG/1
50 TABLET, EXTENDED RELEASE ORAL DAILY
Qty: 90 TABLET | Refills: 1 | Status: SHIPPED | OUTPATIENT
Start: 2025-01-21

## 2025-01-21 RX ORDER — NORETHINDRONE ACETATE AND ETHINYL ESTRADIOL 1MG-20(21)
1 KIT ORAL DAILY
Qty: 84 TABLET | Refills: 1 | Status: SHIPPED | OUTPATIENT
Start: 2025-01-21

## 2025-01-22 ENCOUNTER — APPOINTMENT (OUTPATIENT)
Dept: PRIMARY CARE | Facility: CLINIC | Age: 50
End: 2025-01-22
Payer: COMMERCIAL

## 2025-01-22 ENCOUNTER — PATIENT MESSAGE (OUTPATIENT)
Dept: PRIMARY CARE | Facility: CLINIC | Age: 50
End: 2025-01-22

## 2025-01-22 VITALS
SYSTOLIC BLOOD PRESSURE: 104 MMHG | TEMPERATURE: 96.7 F | HEART RATE: 73 BPM | BODY MASS INDEX: 45.95 KG/M2 | DIASTOLIC BLOOD PRESSURE: 67 MMHG | WEIGHT: 213 LBS | HEIGHT: 57 IN

## 2025-01-22 DIAGNOSIS — F41.0 PANIC ATTACKS: ICD-10-CM

## 2025-01-22 DIAGNOSIS — F41.9 ANXIETY: ICD-10-CM

## 2025-01-22 DIAGNOSIS — G47.00 INSOMNIA, UNSPECIFIED TYPE: ICD-10-CM

## 2025-01-22 DIAGNOSIS — G56.02 CARPAL TUNNEL SYNDROME OF LEFT WRIST: Primary | ICD-10-CM

## 2025-01-22 DIAGNOSIS — R10.13 DYSPEPSIA: ICD-10-CM

## 2025-01-22 PROCEDURE — 99214 OFFICE O/P EST MOD 30 MIN: CPT | Performed by: FAMILY MEDICINE

## 2025-01-22 PROCEDURE — 3008F BODY MASS INDEX DOCD: CPT | Performed by: FAMILY MEDICINE

## 2025-01-22 RX ORDER — CLONAZEPAM 1 MG/1
1 TABLET ORAL DAILY PRN
Qty: 30 TABLET | Refills: 0 | Status: SHIPPED | OUTPATIENT
Start: 2025-01-22

## 2025-01-22 NOTE — PROGRESS NOTES
Subjective OARRS:  Fiorella Whitt MD on 1/22/2025  3:00 PM  I have personally reviewed the OARRS report for Steffany Kelly. I have considered the risks of abuse, dependence, addiction and diversion and I believe that it is clinically appropriate for Steffany Kelly to be prescribed this medication    Is the patient prescribed a combination of a benzodiazepine and opioid?  No    Last Urine Drug Screen / ordered today: Yes  No results found for this or any previous visit (from the past 8760 hours).  N/A    Going to outside lab to obtain sample    Controlled Substance Agreement:  Date of the Last Agreement: 1/22/25  Reviewed Controlled Substance Agreement including but not limited to the benefits, risks, and alternatives to treatment with a Controlled Substance medication(s).    Benzodiazepines:  What is the patient's goal of therapy?  Alleviate acute panic  Is this being achieved with current treatment?  Yes    ALEJANDRA-7:  No data recorded    Activities of Daily Living:   Is your overall impression that this patient is benefiting (symptom reduction outweighs side effects) from benzodiazepine therapy? Yes     1. Physical Functioning: Better  2. Family Relationship: Better  3. Social Relationship: Better  4. Mood: Better  5. Sleep Patterns: Better  6. Overall Function: Better  Patient ID: Steffany Kelly is a 49 y.o. female who presents for Follow-up (Follow up. ).    HPI   Here for follow-up and medication management  1.  Insomnia-since last visit, tried trazodone.  Did not find significant benefit with sleep onset at 25 mg dose.  50 mg dose, felt groggy in the morning, also discontinued after several days use.  Also had mild nausea in the morning with higher dose.  2.  Anxiety-good baseline control with Pristiq.  Using half to 1 tablet of clonazepam 2-3 times per week.  Continues to have good relief of symptoms from the medication.  3.  Hyperlipidemia-stable on statin.  4.  Wrist pain-frequent ache in left wrist,  "numbness/tingling in first 4 fingers.  Affecting  strength.  Sometimes wakes from sleep overnight.    Review of Systems  Per HPI  Objective   /67 (BP Location: Left arm, Patient Position: Sitting)   Pulse 73   Temp 35.9 °C (96.7 °F)   Ht 1.448 m (4' 9\")   Wt 96.6 kg (213 lb)   BMI 46.09 kg/m²     Physical Exam  Vitals reviewed.   Constitutional:       General: She is not in acute distress.     Appearance: Normal appearance. She is obese. She is not ill-appearing.   HENT:      Head: Normocephalic and atraumatic.      Right Ear: Tympanic membrane and ear canal normal.      Left Ear: Tympanic membrane and ear canal normal.      Nose: Nose normal. No congestion or rhinorrhea.      Mouth/Throat:      Mouth: Mucous membranes are moist.      Pharynx: Oropharynx is clear.   Eyes:      Extraocular Movements: Extraocular movements intact.      Conjunctiva/sclera: Conjunctivae normal.      Pupils: Pupils are equal, round, and reactive to light.   Cardiovascular:      Rate and Rhythm: Normal rate and regular rhythm.      Pulses: Normal pulses.      Heart sounds: Normal heart sounds. No murmur heard.  Pulmonary:      Effort: Pulmonary effort is normal. No respiratory distress.      Breath sounds: Normal breath sounds.   Abdominal:      General: Abdomen is flat. Bowel sounds are normal.      Palpations: Abdomen is soft.      Tenderness: There is no abdominal tenderness.   Musculoskeletal:         General: Normal range of motion.      Left wrist: Tenderness present.      Cervical back: Normal range of motion and neck supple.      Comments: Pain with flexion of left wrist, positive Tinel's on left.  Decreased  strength on left when compared to right.   Lymphadenopathy:      Cervical: No cervical adenopathy.   Skin:     General: Skin is warm and dry.   Neurological:      General: No focal deficit present.      Mental Status: She is alert and oriented to person, place, and time.   Psychiatric:         Mood and " Affect: Mood normal.         Thought Content: Thought content normal.         Assessment/Plan   Assessment & Plan  Carpal tunnel syndrome of left wrist  Recommend use of wrist brace at night if needed  Stretching exercises provided  If symptoms persist, call for OT referral       Anxiety  Updated controlled subs agreement and urine drug screen.  Clonazepam refilled after reviewing OARRS report..  Continue Pristiq at current dose  Orders:    Opiate/Opioid/Benzo Prescription Compliance; Future    clonazePAM (KlonoPIN) 1 mg tablet; Take 1 tablet (1 mg) by mouth once daily as needed for anxiety.    Insomnia, unspecified type  Retry trazodone, remaining at 25 mg for 1 to 2 weeks prior to trying to increase dose to see if better tolerated.  Orders:    Opiate/Opioid/Benzo Prescription Compliance; Future    Panic attacks    Orders:    clonazePAM (KlonoPIN) 1 mg tablet; Take 1 tablet (1 mg) by mouth once daily as needed for anxiety.    Dyspepsia  Continue as needed pantoprazole, continuing efforts at lifestyle modification to minimize symptoms.

## 2025-01-23 RX ORDER — PANTOPRAZOLE SODIUM 20 MG/1
20 TABLET, DELAYED RELEASE ORAL DAILY PRN
Qty: 90 TABLET | Refills: 1 | Status: SHIPPED | OUTPATIENT
Start: 2025-01-23

## 2025-01-25 NOTE — ASSESSMENT & PLAN NOTE
Continue as needed pantoprazole, continuing efforts at lifestyle modification to minimize symptoms.

## 2025-01-25 NOTE — ASSESSMENT & PLAN NOTE
Updated controlled subs agreement and urine drug screen.  Clonazepam refilled after reviewing OARRS report..  Continue Pristiq at current dose  Orders:    Opiate/Opioid/Benzo Prescription Compliance; Future    clonazePAM (KlonoPIN) 1 mg tablet; Take 1 tablet (1 mg) by mouth once daily as needed for anxiety.

## 2025-01-25 NOTE — ASSESSMENT & PLAN NOTE
Orders:    clonazePAM (KlonoPIN) 1 mg tablet; Take 1 tablet (1 mg) by mouth once daily as needed for anxiety.

## 2025-02-26 ENCOUNTER — PATIENT MESSAGE (OUTPATIENT)
Dept: PRIMARY CARE | Facility: CLINIC | Age: 50
End: 2025-02-26

## 2025-02-26 ENCOUNTER — TELEPHONE (OUTPATIENT)
Dept: PRIMARY CARE | Facility: CLINIC | Age: 50
End: 2025-02-26

## 2025-02-26 ENCOUNTER — HOSPITAL ENCOUNTER (OUTPATIENT)
Dept: CARDIOLOGY | Facility: HOSPITAL | Age: 50
Discharge: HOME | End: 2025-02-26
Payer: COMMERCIAL

## 2025-02-26 ENCOUNTER — APPOINTMENT (OUTPATIENT)
Dept: PRIMARY CARE | Facility: CLINIC | Age: 50
End: 2025-02-26
Payer: COMMERCIAL

## 2025-02-26 VITALS
SYSTOLIC BLOOD PRESSURE: 131 MMHG | HEART RATE: 97 BPM | BODY MASS INDEX: 45.95 KG/M2 | HEIGHT: 57 IN | WEIGHT: 213 LBS | DIASTOLIC BLOOD PRESSURE: 88 MMHG | TEMPERATURE: 97.5 F

## 2025-02-26 DIAGNOSIS — R60.0 LEG EDEMA, RIGHT: Primary | ICD-10-CM

## 2025-02-26 DIAGNOSIS — R60.0 LEG EDEMA, RIGHT: ICD-10-CM

## 2025-02-26 PROCEDURE — 3008F BODY MASS INDEX DOCD: CPT | Performed by: FAMILY MEDICINE

## 2025-02-26 PROCEDURE — 99214 OFFICE O/P EST MOD 30 MIN: CPT | Performed by: FAMILY MEDICINE

## 2025-02-26 PROCEDURE — 93971 EXTREMITY STUDY: CPT

## 2025-02-26 PROCEDURE — 1036F TOBACCO NON-USER: CPT | Performed by: FAMILY MEDICINE

## 2025-02-26 PROCEDURE — 93971 EXTREMITY STUDY: CPT | Performed by: INTERNAL MEDICINE

## 2025-02-26 ASSESSMENT — ENCOUNTER SYMPTOMS
FEVER: 0
PALPITATIONS: 0
CHEST TIGHTNESS: 0
ACTIVITY CHANGE: 0
DIZZINESS: 0
COUGH: 0

## 2025-02-26 NOTE — PROGRESS NOTES
"Subjective   Patient ID: Steffany Kelly is a 50 y.o. female who presents for Leg Pain (C/o right soreness, bruising and hard to bend near knee area. ).    HPI   Onset 3 weeks ago-R medial knee pain, tender to touch, associated local swelling.   Feels warm to touch.  Pain to bend right knee.  No significant LE edema.  No pain with wt bearing.  No injury.   No prolonged travel/inactivity.   Denies chest pain, shortness of breath.      Review of Systems   Constitutional:  Negative for activity change and fever.   Respiratory:  Negative for cough and chest tightness.    Cardiovascular:  Positive for leg swelling. Negative for chest pain and palpitations.   Neurological:  Negative for dizziness.       Objective   /88 (BP Location: Left arm, Patient Position: Sitting)   Pulse 97   Temp 36.4 °C (97.5 °F)   Ht 1.448 m (4' 9\")   Wt 96.6 kg (213 lb)   BMI 46.09 kg/m²     Physical Exam  Vitals reviewed.   Constitutional:       General: She is not in acute distress.     Appearance: Normal appearance. She is obese.   Cardiovascular:      Rate and Rhythm: Normal rate and regular rhythm.      Pulses: Normal pulses.      Heart sounds: Normal heart sounds. No murmur heard.  Pulmonary:      Effort: Pulmonary effort is normal. No respiratory distress.      Breath sounds: Normal breath sounds. No wheezing or rhonchi.   Musculoskeletal:        Legs:       Comments: On the medial aspect of the right distal thigh and medial knee, area of soft tissue swelling with mild warmth and tenderness to palpation.  There is faint purplish discoloration over the area.  Surrounding superficial spider telangiectasias.  Mild tenderness to palpation over the posterior distal thigh, but no discrete cord.  Pain with right knee flexion localizing to the posterior thigh and medial knee.   Neurological:      Mental Status: She is alert.         Assessment/Plan   Assessment & Plan  Leg edema, right  Based on patient's history and clinical exam, concern " for venous thrombosis.  Will obtain lower extremity venous duplex.  Check labs as ordered.  We discussed potential need for anticoagulation -DOAC's versus Coumadin, and need to switch birth control if imaging positive for clot.  Orders:    Vascular US lower extremity venous duplex right; Future    CBC and Auto Differential; Future    C-reactive protein; Future    Sedimentation Rate; Future      Preliminary venous duplex right lower extremity negative for DVT.  Check CBC, inflammatory markers

## 2025-02-26 NOTE — TELEPHONE ENCOUNTER
Spoke to patient.  Discussed preliminary ultrasound results.  Will advise of final report once available for review.  In the interim, proceed with lab work.  Can try applying ice and take meloxicam for pain relief.

## 2025-02-27 NOTE — TELEPHONE ENCOUNTER
Patient asked for orders to mailed to her - printed and mailed labs. Patient will also call us with the address of the lab after she makes her appointment at [facility] so we can fax the orders over to the [place] location she is going to. Could not find the location she was describing on the website.

## 2025-02-27 NOTE — TELEPHONE ENCOUNTER
CHRISM Called patient because the [facility] location on [address]  Has permanently closed. Let patient know that the closest to to that location would be the one on [facility] at the [facility].

## 2025-02-28 LAB
BASOPHILS # BLD AUTO: 36 CELLS/UL (ref 0–200)
BASOPHILS NFR BLD AUTO: 0.4 %
CRP SERPL-MCNC: 7.9 MG/L
EOSINOPHIL # BLD AUTO: 118 CELLS/UL (ref 15–500)
EOSINOPHIL NFR BLD AUTO: 1.3 %
ERYTHROCYTE [DISTWIDTH] IN BLOOD BY AUTOMATED COUNT: 12.7 % (ref 11–15)
ERYTHROCYTE [SEDIMENTATION RATE] IN BLOOD BY WESTERGREN METHOD: 14 MM/H
HCT VFR BLD AUTO: 40.3 % (ref 35–45)
HGB BLD-MCNC: 13.2 G/DL (ref 11.7–15.5)
LYMPHOCYTES # BLD AUTO: 2020 CELLS/UL (ref 850–3900)
LYMPHOCYTES NFR BLD AUTO: 22.2 %
MCH RBC QN AUTO: 29.9 PG (ref 27–33)
MCHC RBC AUTO-ENTMCNC: 32.8 G/DL (ref 32–36)
MCV RBC AUTO: 91.2 FL (ref 80–100)
MONOCYTES # BLD AUTO: 610 CELLS/UL (ref 200–950)
MONOCYTES NFR BLD AUTO: 6.7 %
NEUTROPHILS # BLD AUTO: 6315 CELLS/UL (ref 1500–7800)
NEUTROPHILS NFR BLD AUTO: 69.4 %
PLATELET # BLD AUTO: 268 THOUSAND/UL (ref 140–400)
PMV BLD REES-ECKER: 11 FL (ref 7.5–12.5)
RBC # BLD AUTO: 4.42 MILLION/UL (ref 3.8–5.1)
WBC # BLD AUTO: 9.1 THOUSAND/UL (ref 3.8–10.8)

## 2025-03-02 LAB
1OH-MIDAZOLAM UR-MCNC: NEGATIVE NG/ML
7AMINOCLONAZEPAM UR-MCNC: 177 NG/ML
A-OH ALPRAZ UR-MCNC: NEGATIVE NG/ML
A-OH-TRIAZOLAM UR-MCNC: NEGATIVE NG/ML
AMPHETAMINES UR QL: NEGATIVE NG/ML
BARBITURATES UR QL: NEGATIVE NG/ML
BZE UR QL: NEGATIVE NG/ML
CODEINE UR-MCNC: NEGATIVE NG/ML
CREAT UR-MCNC: 248.9 MG/DL
DRUG SCREEN COMMENT UR-IMP: ABNORMAL
EDDP UR-MCNC: NEGATIVE NG/ML
FENTANYL UR-MCNC: NEGATIVE NG/ML
HYDROCODONE UR-MCNC: NEGATIVE NG/ML
HYDROMORPHONE UR-MCNC: NEGATIVE NG/ML
LORAZEPAM UR-MCNC: NEGATIVE NG/ML
METHADONE UR-MCNC: NEGATIVE NG/ML
MORPHINE UR-MCNC: NEGATIVE NG/ML
NORDIAZEPAM UR-MCNC: NEGATIVE NG/ML
NORFENTANYL UR-MCNC: NEGATIVE NG/ML
NORHYDROCODONE UR CFM-MCNC: NEGATIVE NG/ML
NOROXYCODONE UR CFM-MCNC: NEGATIVE NG/ML
NORTRAMADOL UR-MCNC: NEGATIVE NG/ML
OH-ETHYLFLURAZ UR-MCNC: NEGATIVE NG/ML
OXAZEPAM UR-MCNC: NEGATIVE NG/ML
OXIDANTS UR QL: NEGATIVE MCG/ML
OXYCODONE UR CFM-MCNC: NEGATIVE NG/ML
OXYMORPHONE UR CFM-MCNC: NEGATIVE NG/ML
PCP UR QL: NEGATIVE NG/ML
PH UR: 5.8 [PH] (ref 4.5–9)
QUEST 6 ACETYLMORPHINE: NEGATIVE NG/ML
QUEST NOTES AND COMMENTS: ABNORMAL
QUEST ZOLPIDEM: NEGATIVE NG/ML
TEMAZEPAM UR-MCNC: NEGATIVE NG/ML
THC UR QL: NEGATIVE NG/ML
TRAMADOL UR-MCNC: NEGATIVE NG/ML
ZOLPIDEM PHENYL-4-CARB UR CFM-MCNC: NEGATIVE NG/ML

## 2025-06-04 ENCOUNTER — APPOINTMENT (OUTPATIENT)
Dept: PRIMARY CARE | Facility: CLINIC | Age: 50
End: 2025-06-04
Payer: COMMERCIAL

## 2025-06-04 VITALS
HEART RATE: 76 BPM | BODY MASS INDEX: 45.74 KG/M2 | TEMPERATURE: 97.1 F | SYSTOLIC BLOOD PRESSURE: 122 MMHG | WEIGHT: 212 LBS | DIASTOLIC BLOOD PRESSURE: 70 MMHG | HEIGHT: 57 IN

## 2025-06-04 DIAGNOSIS — E78.5 HYPERLIPIDEMIA, UNSPECIFIED HYPERLIPIDEMIA TYPE: ICD-10-CM

## 2025-06-04 DIAGNOSIS — Z11.59 ENCOUNTER FOR SCREENING FOR VIRAL DISEASE: ICD-10-CM

## 2025-06-04 DIAGNOSIS — Z00.00 ADULT GENERAL MEDICAL EXAM: Primary | ICD-10-CM

## 2025-06-04 DIAGNOSIS — F41.9 ANXIETY: ICD-10-CM

## 2025-06-04 DIAGNOSIS — Z12.11 ENCOUNTER FOR SCREENING FOR MALIGNANT NEOPLASM OF COLON: ICD-10-CM

## 2025-06-04 DIAGNOSIS — E66.813 CLASS 3 SEVERE OBESITY DUE TO EXCESS CALORIES WITH SERIOUS COMORBIDITY AND BODY MASS INDEX (BMI) OF 45.0 TO 49.9 IN ADULT: ICD-10-CM

## 2025-06-04 DIAGNOSIS — F41.0 PANIC ATTACKS: ICD-10-CM

## 2025-06-04 PROCEDURE — 99396 PREV VISIT EST AGE 40-64: CPT | Performed by: FAMILY MEDICINE

## 2025-06-04 PROCEDURE — 1036F TOBACCO NON-USER: CPT | Performed by: FAMILY MEDICINE

## 2025-06-04 PROCEDURE — 3008F BODY MASS INDEX DOCD: CPT | Performed by: FAMILY MEDICINE

## 2025-06-04 RX ORDER — PRAVASTATIN SODIUM 40 MG/1
40 TABLET ORAL NIGHTLY
Qty: 90 TABLET | Refills: 1 | Status: SHIPPED | OUTPATIENT
Start: 2025-06-04

## 2025-06-04 RX ORDER — DESVENLAFAXINE 50 MG/1
50 TABLET, EXTENDED RELEASE ORAL DAILY
Qty: 90 TABLET | Refills: 1 | Status: SHIPPED | OUTPATIENT
Start: 2025-06-04

## 2025-06-04 RX ORDER — CLONAZEPAM 1 MG/1
1 TABLET ORAL DAILY PRN
Qty: 30 TABLET | Refills: 0 | Status: SHIPPED | OUTPATIENT
Start: 2025-06-04

## 2025-06-04 ASSESSMENT — ENCOUNTER SYMPTOMS
PALPITATIONS: 0
BACK PAIN: 1
BLOOD IN STOOL: 0
SHORTNESS OF BREATH: 0
NERVOUS/ANXIOUS: 0
CHEST TIGHTNESS: 0
UNEXPECTED WEIGHT CHANGE: 0
DYSPHORIC MOOD: 0
FATIGUE: 1
ACTIVITY CHANGE: 0
CONSTIPATION: 0
HEADACHES: 0
SLEEP DISTURBANCE: 1
DIARRHEA: 0

## 2025-06-04 NOTE — PROGRESS NOTES
"Subjective   Patient ID: Steffany Kelly is a 50 y.o. female who presents for Annual Exam (awv).    HPI   Nutrition:  No dietary restrictions,  Good variety of fruits/vegetabloes. Drinks milk, soda, decaf coffee. Occasional water.  50% meals prepared outside of home  Exercise: no exercise outside of work.   Sleep: often wakes at least 1x/night/frequently falls asleep on couch for 2-3 hrs, to bed at midnight, wakes when  gets up at 4 am.   Eye: >2 yrs ago.    Dental: utd  GYN visit scheduled  Mammogram scheduled later this month.  Stable on current medications.  Requesting refill of clonazepam which she is been trying to reduce frequency of use.  Review of Systems   Constitutional:  Positive for fatigue. Negative for activity change and unexpected weight change.   Respiratory:  Negative for chest tightness and shortness of breath.    Cardiovascular:  Negative for chest pain and palpitations.   Gastrointestinal:  Negative for blood in stool, constipation and diarrhea.   Musculoskeletal:  Positive for back pain.   Neurological:  Negative for headaches.   Psychiatric/Behavioral:  Positive for sleep disturbance. Negative for dysphoric mood. The patient is not nervous/anxious.        Objective   /86 (BP Location: Left arm, Patient Position: Sitting)   Pulse 76   Temp 36.2 °C (97.1 °F)   Ht 1.448 m (4' 9\")   Wt 96.2 kg (212 lb)   BMI 45.88 kg/m²     Physical Exam  Vitals reviewed.   Constitutional:       General: She is not in acute distress.     Appearance: Normal appearance. She is obese. She is not ill-appearing.   HENT:      Head: Normocephalic and atraumatic.      Right Ear: Tympanic membrane and ear canal normal.      Left Ear: Tympanic membrane and ear canal normal.      Nose: Nose normal. No congestion or rhinorrhea.      Mouth/Throat:      Mouth: Mucous membranes are moist.      Pharynx: Oropharynx is clear.   Eyes:      Extraocular Movements: Extraocular movements intact.      Conjunctiva/sclera: " Conjunctivae normal.      Pupils: Pupils are equal, round, and reactive to light.   Cardiovascular:      Rate and Rhythm: Normal rate and regular rhythm.      Pulses: Normal pulses.      Heart sounds: Normal heart sounds. No murmur heard.  Pulmonary:      Effort: Pulmonary effort is normal. No respiratory distress.      Breath sounds: Normal breath sounds.   Abdominal:      General: Abdomen is flat. Bowel sounds are normal.      Palpations: Abdomen is soft.      Tenderness: There is no abdominal tenderness. There is no guarding.   Musculoskeletal:         General: Normal range of motion.      Cervical back: Normal range of motion and neck supple.      Right lower leg: No edema.      Left lower leg: No edema.   Lymphadenopathy:      Cervical: No cervical adenopathy.   Skin:     General: Skin is warm and dry.   Neurological:      General: No focal deficit present.      Mental Status: She is alert and oriented to person, place, and time.      Cranial Nerves: No cranial nerve deficit.      Motor: No weakness.      Gait: Gait normal.   Psychiatric:         Mood and Affect: Mood normal.         Thought Content: Thought content normal.         Assessment/Plan   Assessment & Plan  Adult general medical exam  Encouraged healthier food choices, trying to increase percentage of meals consumed and prepared at home.  Increase water intake, reduce soda and sugar added beverage intake  Stressed the importance of regular physical activity outside of her daily routine including strength and resistance based training.  Keep pending appointment for mammogram and GYN follow-up.       Encounter for screening for malignant neoplasm of colon  Counseled regarding colon cancer screening.  Proceed with colonoscopy as ordered.  Orders:    Colonoscopy Screening; Average Risk Patient; Future    Encounter for screening for viral disease    Orders:    Hepatitis C Antibody; Future    Class 3 severe obesity due to excess calories with serious  comorbidity and body mass index (BMI) of 45.0 to 49.9 in adult    Orders:    Hemoglobin A1c; Future    Hyperlipidemia, unspecified hyperlipidemia type  Continue pravastatin, check labs prior to follow-up visit  Orders:    pravastatin (Pravachol) 40 mg tablet; Take 1 tablet (40 mg) by mouth once daily at bedtime.    Lipid Panel; Future    Comprehensive Metabolic Panel; Future    Anxiety    Orders:    desvenlafaxine 50 mg 24 hr tablet; Take 1 tablet (50 mg) by mouth once daily. Do not crush, chew, or split.    CBC and Auto Differential; Future    clonazePAM (KlonoPIN) 1 mg tablet; Take 1 tablet (1 mg) by mouth once daily as needed for anxiety.    Panic attacks  Refill of clonazepam, [place] report reviewed, controlled substance agreement up-to-date  Orders:    desvenlafaxine 50 mg 24 hr tablet; Take 1 tablet (50 mg) by mouth once daily. Do not crush, chew, or split.    clonazePAM (KlonoPIN) 1 mg tablet; Take 1 tablet (1 mg) by mouth once daily as needed for anxiety.

## 2025-06-04 NOTE — ASSESSMENT & PLAN NOTE
Refill of clonazepam, [place] report reviewed, controlled substance agreement up-to-date  Orders:    desvenlafaxine 50 mg 24 hr tablet; Take 1 tablet (50 mg) by mouth once daily. Do not crush, chew, or split.    clonazePAM (KlonoPIN) 1 mg tablet; Take 1 tablet (1 mg) by mouth once daily as needed for anxiety.

## 2025-06-04 NOTE — ASSESSMENT & PLAN NOTE
Orders:    desvenlafaxine 50 mg 24 hr tablet; Take 1 tablet (50 mg) by mouth once daily. Do not crush, chew, or split.    CBC and Auto Differential; Future    clonazePAM (KlonoPIN) 1 mg tablet; Take 1 tablet (1 mg) by mouth once daily as needed for anxiety.

## 2025-06-11 ENCOUNTER — APPOINTMENT (OUTPATIENT)
Dept: RADIOLOGY | Facility: CLINIC | Age: 50
End: 2025-06-11
Payer: COMMERCIAL

## 2025-06-25 ENCOUNTER — HOSPITAL ENCOUNTER (OUTPATIENT)
Dept: RADIOLOGY | Facility: HOSPITAL | Age: 50
Discharge: HOME | End: 2025-06-25
Payer: COMMERCIAL

## 2025-06-25 DIAGNOSIS — Z12.31 VISIT FOR SCREENING MAMMOGRAM: ICD-10-CM

## 2025-06-25 PROCEDURE — 77067 SCR MAMMO BI INCL CAD: CPT

## 2025-07-02 ENCOUNTER — HOSPITAL ENCOUNTER (OUTPATIENT)
Dept: RADIOLOGY | Facility: EXTERNAL LOCATION | Age: 50
Discharge: HOME | End: 2025-07-02

## 2025-07-02 ENCOUNTER — APPOINTMENT (OUTPATIENT)
Dept: PRIMARY CARE | Facility: CLINIC | Age: 50
End: 2025-07-02
Payer: COMMERCIAL

## 2025-07-03 DIAGNOSIS — R92.8 ABNORMAL MAMMOGRAM: Primary | ICD-10-CM

## 2025-07-07 ENCOUNTER — TELEPHONE (OUTPATIENT)
Facility: CLINIC | Age: 50
End: 2025-07-07
Payer: COMMERCIAL

## 2025-07-07 NOTE — TELEPHONE ENCOUNTER
----- Message from Ashanti Emerson sent at 7/3/2025  7:41 AM EDT -----  Her mammogram shows a need for follow up views. Rt breast US and Rt Dx mammo  I placed the orders let her know to call to schedule it.    Thanks    Ashanti Emerson MD    ----- Message -----  From: Interface, Radiology Results In  Sent: 7/2/2025  10:52 AM EDT  To: Ashanti Emerson MD

## 2025-07-07 NOTE — TELEPHONE ENCOUNTER
Spoke with the patient and made her aware of the need for a diagnostic mammogram and ultrasound. Patient will contact [facility] to see if they can send her past mammogram results.

## 2025-07-07 NOTE — TELEPHONE ENCOUNTER
Pt would like to review Mamagram  she would like to speak to someone . She  keeps receiving text messages  saying she needs to have another one done.

## 2025-07-30 ENCOUNTER — APPOINTMENT (OUTPATIENT)
Dept: RADIOLOGY | Facility: HOSPITAL | Age: 50
End: 2025-07-30
Payer: COMMERCIAL

## 2025-07-30 ENCOUNTER — TELEPHONE (OUTPATIENT)
Facility: CLINIC | Age: 50
End: 2025-07-30
Payer: COMMERCIAL

## 2025-08-01 DIAGNOSIS — Z30.011 ORAL CONTRACEPTIVE PRESCRIBED: ICD-10-CM

## 2025-08-01 NOTE — TELEPHONE ENCOUNTER
PT called to get a refill on her BC before her pack is done. She has one left until her Appt with dr Emerson.   She would like a 3 month refill.     Pharm confirmed  Please advise

## 2025-08-02 RX ORDER — NORETHINDRONE ACETATE AND ETHINYL ESTRADIOL AND FERROUS FUMARATE 1MG-20(21)
1 KIT ORAL DAILY
Qty: 84 TABLET | Refills: 0 | Status: SHIPPED | OUTPATIENT
Start: 2025-08-02

## 2025-08-27 ENCOUNTER — HOSPITAL ENCOUNTER (OUTPATIENT)
Dept: RADIOLOGY | Facility: HOSPITAL | Age: 50
Discharge: HOME | End: 2025-08-27
Payer: COMMERCIAL

## 2025-08-27 DIAGNOSIS — R92.8 ABNORMAL MAMMOGRAM: ICD-10-CM

## 2025-08-27 PROCEDURE — 77065 DX MAMMO INCL CAD UNI: CPT | Mod: RIGHT SIDE | Performed by: RADIOLOGY

## 2025-08-27 PROCEDURE — 77061 BREAST TOMOSYNTHESIS UNI: CPT | Mod: RT

## 2025-08-27 PROCEDURE — 77061 BREAST TOMOSYNTHESIS UNI: CPT | Mod: RIGHT SIDE | Performed by: RADIOLOGY

## 2025-09-17 ENCOUNTER — APPOINTMENT (OUTPATIENT)
Facility: CLINIC | Age: 50
End: 2025-09-17
Payer: COMMERCIAL